# Patient Record
Sex: MALE | Race: BLACK OR AFRICAN AMERICAN | Employment: FULL TIME | URBAN - METROPOLITAN AREA
[De-identification: names, ages, dates, MRNs, and addresses within clinical notes are randomized per-mention and may not be internally consistent; named-entity substitution may affect disease eponyms.]

---

## 2017-02-20 RX ORDER — LOSARTAN POTASSIUM AND HYDROCHLOROTHIAZIDE 25; 100 MG/1; MG/1
TABLET ORAL
Qty: 30 TABLET | Refills: 0 | Status: SHIPPED | OUTPATIENT
Start: 2017-02-20 | End: 2017-03-21 | Stop reason: SDUPTHER

## 2017-06-13 ENCOUNTER — OFFICE VISIT (OUTPATIENT)
Dept: FAMILY MEDICINE CLINIC | Age: 32
End: 2017-06-13

## 2017-06-13 VITALS
TEMPERATURE: 98.6 F | HEART RATE: 76 BPM | RESPIRATION RATE: 20 BRPM | WEIGHT: 236 LBS | BODY MASS INDEX: 34.96 KG/M2 | DIASTOLIC BLOOD PRESSURE: 80 MMHG | HEIGHT: 69 IN | SYSTOLIC BLOOD PRESSURE: 124 MMHG

## 2017-06-13 DIAGNOSIS — J45.30 MILD PERSISTENT ASTHMA WITHOUT COMPLICATION: ICD-10-CM

## 2017-06-13 DIAGNOSIS — Z86.59 HISTORY OF DEPRESSION: ICD-10-CM

## 2017-06-13 DIAGNOSIS — I10 ESSENTIAL HYPERTENSION: Primary | ICD-10-CM

## 2017-06-13 DIAGNOSIS — I10 ESSENTIAL HYPERTENSION: ICD-10-CM

## 2017-06-13 DIAGNOSIS — I62.9 INTRACRANIAL HEMORRHAGE (HCC): ICD-10-CM

## 2017-06-13 PROBLEM — H18.602 KERATOCONUS OF LEFT EYE: Status: ACTIVE | Noted: 2017-06-13

## 2017-06-13 LAB
A/G RATIO: 1.6 (ref 1.1–2.2)
ALBUMIN SERPL-MCNC: 4.8 G/DL (ref 3.4–5)
ALP BLD-CCNC: 56 U/L (ref 40–129)
ALT SERPL-CCNC: 17 U/L (ref 10–40)
ANION GAP SERPL CALCULATED.3IONS-SCNC: 15 MMOL/L (ref 3–16)
AST SERPL-CCNC: 29 U/L (ref 15–37)
BILIRUB SERPL-MCNC: 0.5 MG/DL (ref 0–1)
BUN BLDV-MCNC: 24 MG/DL (ref 7–20)
CALCIUM SERPL-MCNC: 9.9 MG/DL (ref 8.3–10.6)
CHLORIDE BLD-SCNC: 99 MMOL/L (ref 99–110)
CHOLESTEROL, TOTAL: 184 MG/DL (ref 0–199)
CO2: 26 MMOL/L (ref 21–32)
CREAT SERPL-MCNC: 1.2 MG/DL (ref 0.9–1.3)
GFR AFRICAN AMERICAN: >60
GFR NON-AFRICAN AMERICAN: >60
GLOBULIN: 3 G/DL
GLUCOSE BLD-MCNC: 87 MG/DL (ref 70–99)
HDLC SERPL-MCNC: 72 MG/DL (ref 40–60)
LDL CHOLESTEROL CALCULATED: 102 MG/DL
POTASSIUM SERPL-SCNC: 3.7 MMOL/L (ref 3.5–5.1)
SODIUM BLD-SCNC: 140 MMOL/L (ref 136–145)
TOTAL PROTEIN: 7.8 G/DL (ref 6.4–8.2)
TRIGL SERPL-MCNC: 52 MG/DL (ref 0–150)
VLDLC SERPL CALC-MCNC: 10 MG/DL

## 2017-06-13 PROCEDURE — 99214 OFFICE O/P EST MOD 30 MIN: CPT | Performed by: FAMILY MEDICINE

## 2017-06-13 ASSESSMENT — PATIENT HEALTH QUESTIONNAIRE - PHQ9
2. FEELING DOWN, DEPRESSED OR HOPELESS: 0
SUM OF ALL RESPONSES TO PHQ9 QUESTIONS 1 & 2: 0
SUM OF ALL RESPONSES TO PHQ QUESTIONS 1-9: 0
1. LITTLE INTEREST OR PLEASURE IN DOING THINGS: 0

## 2017-07-24 RX ORDER — AMLODIPINE BESYLATE 10 MG/1
TABLET ORAL
Qty: 30 TABLET | Refills: 5 | Status: SHIPPED | OUTPATIENT
Start: 2017-07-24 | End: 2017-12-12 | Stop reason: SDUPTHER

## 2017-07-24 RX ORDER — LOSARTAN POTASSIUM AND HYDROCHLOROTHIAZIDE 25; 100 MG/1; MG/1
TABLET ORAL
Qty: 30 TABLET | Refills: 5 | Status: SHIPPED | OUTPATIENT
Start: 2017-07-24 | End: 2017-12-12 | Stop reason: SDUPTHER

## 2017-09-26 RX ORDER — ALBUTEROL SULFATE 2.5 MG/3ML
SOLUTION RESPIRATORY (INHALATION)
Qty: 900 VIAL | Refills: 0 | Status: SHIPPED | OUTPATIENT
Start: 2017-09-26 | End: 2019-06-24 | Stop reason: SDUPTHER

## 2017-12-12 ENCOUNTER — OFFICE VISIT (OUTPATIENT)
Dept: FAMILY MEDICINE CLINIC | Age: 32
End: 2017-12-12

## 2017-12-12 VITALS
RESPIRATION RATE: 12 BRPM | SYSTOLIC BLOOD PRESSURE: 132 MMHG | DIASTOLIC BLOOD PRESSURE: 82 MMHG | WEIGHT: 244 LBS | TEMPERATURE: 98 F | HEIGHT: 69 IN | BODY MASS INDEX: 36.14 KG/M2 | HEART RATE: 77 BPM

## 2017-12-12 DIAGNOSIS — J45.30 MILD PERSISTENT ASTHMA WITHOUT COMPLICATION: ICD-10-CM

## 2017-12-12 DIAGNOSIS — J06.9 VIRAL URI: ICD-10-CM

## 2017-12-12 DIAGNOSIS — I10 ESSENTIAL HYPERTENSION: Primary | ICD-10-CM

## 2017-12-12 PROCEDURE — 90471 IMMUNIZATION ADMIN: CPT | Performed by: FAMILY MEDICINE

## 2017-12-12 PROCEDURE — 99214 OFFICE O/P EST MOD 30 MIN: CPT | Performed by: FAMILY MEDICINE

## 2017-12-12 PROCEDURE — 90630 INFLUENZA, QUADV, 18-64 YRS, ID, PF, MICRO INJ, 0.1ML (FLUZONE QUADV, PF): CPT | Performed by: FAMILY MEDICINE

## 2017-12-12 RX ORDER — FLUTICASONE FUROATE AND VILANTEROL 100; 25 UG/1; UG/1
1 POWDER RESPIRATORY (INHALATION) DAILY
Qty: 28 EACH | Refills: 5 | Status: SHIPPED | OUTPATIENT
Start: 2017-12-12 | End: 2018-12-10

## 2017-12-12 RX ORDER — DEXTROMETHORPHAN HYDROBROMIDE AND PROMETHAZINE HYDROCHLORIDE 15; 6.25 MG/5ML; MG/5ML
5 SYRUP ORAL 4 TIMES DAILY PRN
Qty: 240 ML | Refills: 0 | Status: SHIPPED | OUTPATIENT
Start: 2017-12-12 | End: 2017-12-19

## 2017-12-12 RX ORDER — AMLODIPINE BESYLATE 10 MG/1
TABLET ORAL
Qty: 30 TABLET | Refills: 5 | Status: SHIPPED | OUTPATIENT
Start: 2017-12-12 | End: 2018-08-02 | Stop reason: SDUPTHER

## 2017-12-12 RX ORDER — LOSARTAN POTASSIUM AND HYDROCHLOROTHIAZIDE 25; 100 MG/1; MG/1
TABLET ORAL
Qty: 30 TABLET | Refills: 5 | Status: SHIPPED | OUTPATIENT
Start: 2017-12-12 | End: 2018-08-06 | Stop reason: SDUPTHER

## 2017-12-12 NOTE — PROGRESS NOTES
Vaccine Information Sheet, \"Influenza - Inactivated\"  given to Pema Cargo, or parent/legal guardian of  Pema Cargo and verbalized understanding. Patient responses:    Have you ever had a reaction to a flu vaccine? YES  Are you able to eat eggs without adverse effects? No  Do you have any current illness? No  Have you ever had [place] Syndrome? No    Flu vaccine given per order. Please see immunization tab.

## 2018-06-04 ENCOUNTER — TELEPHONE (OUTPATIENT)
Dept: FAMILY MEDICINE CLINIC | Age: 33
End: 2018-06-04

## 2018-06-04 NOTE — TELEPHONE ENCOUNTER
His aneurysm was in 2013. His 'care' after the aneurysm is monitoring his hypertension. So I am confused as to what he needs this letter to say.

## 2018-06-12 ENCOUNTER — OFFICE VISIT (OUTPATIENT)
Dept: FAMILY MEDICINE CLINIC | Age: 33
End: 2018-06-12

## 2018-06-12 VITALS
RESPIRATION RATE: 16 BRPM | WEIGHT: 248 LBS | TEMPERATURE: 98.2 F | OXYGEN SATURATION: 98 % | HEIGHT: 69 IN | DIASTOLIC BLOOD PRESSURE: 80 MMHG | HEART RATE: 52 BPM | SYSTOLIC BLOOD PRESSURE: 132 MMHG | BODY MASS INDEX: 36.73 KG/M2

## 2018-06-12 DIAGNOSIS — J45.30 MILD PERSISTENT ASTHMA WITHOUT COMPLICATION: ICD-10-CM

## 2018-06-12 DIAGNOSIS — E66.09 CLASS 2 OBESITY DUE TO EXCESS CALORIES WITHOUT SERIOUS COMORBIDITY WITH BODY MASS INDEX (BMI) OF 36.0 TO 36.9 IN ADULT: ICD-10-CM

## 2018-06-12 DIAGNOSIS — Z13.220 SCREENING, LIPID: ICD-10-CM

## 2018-06-12 DIAGNOSIS — I10 ESSENTIAL HYPERTENSION: Primary | ICD-10-CM

## 2018-06-12 DIAGNOSIS — I10 ESSENTIAL HYPERTENSION: ICD-10-CM

## 2018-06-12 DIAGNOSIS — F10.11 HISTORY OF ALCOHOL ABUSE: ICD-10-CM

## 2018-06-12 LAB
A/G RATIO: 1.5 (ref 1.1–2.2)
ALBUMIN SERPL-MCNC: 4.5 G/DL (ref 3.4–5)
ALP BLD-CCNC: 62 U/L (ref 40–129)
ALT SERPL-CCNC: 17 U/L (ref 10–40)
ANION GAP SERPL CALCULATED.3IONS-SCNC: 11 MMOL/L (ref 3–16)
AST SERPL-CCNC: 21 U/L (ref 15–37)
BILIRUB SERPL-MCNC: 0.3 MG/DL (ref 0–1)
BUN BLDV-MCNC: 24 MG/DL (ref 7–20)
CALCIUM SERPL-MCNC: 9.7 MG/DL (ref 8.3–10.6)
CHLORIDE BLD-SCNC: 100 MMOL/L (ref 99–110)
CHOLESTEROL, TOTAL: 189 MG/DL (ref 0–199)
CO2: 30 MMOL/L (ref 21–32)
CREAT SERPL-MCNC: 1 MG/DL (ref 0.9–1.3)
GFR AFRICAN AMERICAN: >60
GFR NON-AFRICAN AMERICAN: >60
GLOBULIN: 3 G/DL
GLUCOSE BLD-MCNC: 104 MG/DL (ref 70–99)
HDLC SERPL-MCNC: 59 MG/DL (ref 40–60)
LDL CHOLESTEROL CALCULATED: 103 MG/DL
POTASSIUM SERPL-SCNC: 4.2 MMOL/L (ref 3.5–5.1)
SODIUM BLD-SCNC: 141 MMOL/L (ref 136–145)
TOTAL PROTEIN: 7.5 G/DL (ref 6.4–8.2)
TRIGL SERPL-MCNC: 134 MG/DL (ref 0–150)
VLDLC SERPL CALC-MCNC: 27 MG/DL

## 2018-06-12 PROCEDURE — 99214 OFFICE O/P EST MOD 30 MIN: CPT | Performed by: FAMILY MEDICINE

## 2018-08-02 RX ORDER — AMLODIPINE BESYLATE 10 MG/1
TABLET ORAL
Qty: 90 TABLET | Refills: 1 | Status: SHIPPED | OUTPATIENT
Start: 2018-08-02 | End: 2019-01-31 | Stop reason: SDUPTHER

## 2018-08-06 RX ORDER — LOSARTAN POTASSIUM AND HYDROCHLOROTHIAZIDE 25; 100 MG/1; MG/1
TABLET ORAL
Qty: 30 TABLET | Refills: 5 | Status: SHIPPED
Start: 2018-08-06 | End: 2019-07-16

## 2018-08-17 ENCOUNTER — TELEPHONE (OUTPATIENT)
Dept: FAMILY MEDICINE CLINIC | Age: 33
End: 2018-08-17

## 2018-08-17 RX ORDER — PREDNISONE 20 MG/1
40 TABLET ORAL DAILY
Qty: 10 TABLET | Refills: 0 | Status: SHIPPED | OUTPATIENT
Start: 2018-08-17 | End: 2018-08-22

## 2018-08-17 RX ORDER — BUDESONIDE AND FORMOTEROL FUMARATE DIHYDRATE 160; 4.5 UG/1; UG/1
2 AEROSOL RESPIRATORY (INHALATION) 2 TIMES DAILY
Qty: 1 INHALER | Refills: 3 | Status: SHIPPED
Start: 2018-08-17 | End: 2019-07-16 | Stop reason: SDUPTHER

## 2018-09-27 ENCOUNTER — HOSPITAL ENCOUNTER (EMERGENCY)
Age: 33
Discharge: HOME | End: 2018-09-27
Payer: COMMERCIAL

## 2018-09-27 VITALS
TEMPERATURE: 99.3 F | OXYGEN SATURATION: 98 % | RESPIRATION RATE: 12 BRPM | WEIGHT: 255.29 LBS | SYSTOLIC BLOOD PRESSURE: 131 MMHG | BODY MASS INDEX: 37.7 KG/M2 | DIASTOLIC BLOOD PRESSURE: 91 MMHG | HEART RATE: 72 BPM

## 2018-09-27 DIAGNOSIS — J45.901 EXACERBATION OF ASTHMA, UNSPECIFIED ASTHMA SEVERITY, UNSPECIFIED WHETHER PERSISTENT: ICD-10-CM

## 2018-09-27 DIAGNOSIS — R05.9 COUGH: Primary | ICD-10-CM

## 2018-09-27 PROCEDURE — 99283 EMERGENCY DEPT VISIT LOW MDM: CPT

## 2018-09-27 PROCEDURE — 6370000000 HC RX 637 (ALT 250 FOR IP): Performed by: PHYSICIAN ASSISTANT

## 2018-09-27 PROCEDURE — 94640 AIRWAY INHALATION TREATMENT: CPT

## 2018-09-27 PROCEDURE — 94760 N-INVAS EAR/PLS OXIMETRY 1: CPT

## 2018-09-27 RX ORDER — BENZONATATE 100 MG/1
100 CAPSULE ORAL ONCE
Status: COMPLETED | OUTPATIENT
Start: 2018-09-27 | End: 2018-09-27

## 2018-09-27 RX ORDER — PREDNISONE 20 MG/1
TABLET ORAL
Qty: 27 TABLET | Refills: 0 | Status: SHIPPED | OUTPATIENT
Start: 2018-09-27 | End: 2018-12-10

## 2018-09-27 RX ORDER — PREDNISONE 20 MG/1
60 TABLET ORAL ONCE
Status: COMPLETED | OUTPATIENT
Start: 2018-09-27 | End: 2018-09-27

## 2018-09-27 RX ORDER — BENZONATATE 100 MG/1
100 CAPSULE ORAL 3 TIMES DAILY PRN
Qty: 30 CAPSULE | Refills: 0 | Status: SHIPPED | OUTPATIENT
Start: 2018-09-27 | End: 2018-10-04

## 2018-09-27 RX ORDER — IPRATROPIUM BROMIDE AND ALBUTEROL SULFATE 2.5; .5 MG/3ML; MG/3ML
1 SOLUTION RESPIRATORY (INHALATION) ONCE
Status: COMPLETED | OUTPATIENT
Start: 2018-09-27 | End: 2018-09-27

## 2018-09-27 RX ADMIN — PREDNISONE 60 MG: 20 TABLET ORAL at 18:09

## 2018-09-27 RX ADMIN — BENZONATATE 100 MG: 100 CAPSULE ORAL at 18:09

## 2018-09-27 RX ADMIN — IPRATROPIUM BROMIDE AND ALBUTEROL SULFATE 1 AMPULE: .5; 3 SOLUTION RESPIRATORY (INHALATION) at 18:02

## 2018-09-27 ASSESSMENT — ENCOUNTER SYMPTOMS
ABDOMINAL PAIN: 0
BACK PAIN: 0
NAUSEA: 0
WHEEZING: 1
SHORTNESS OF BREATH: 1
VOMITING: 0
COUGH: 1
EYE PAIN: 0
DIARRHEA: 0

## 2018-09-27 NOTE — ED PROVIDER NOTES
**EVALUATED BY ADVANCED PRACTICE PROVIDER**        [facility]  eMERGENCY dEPARTMENT eNCOUnter      Pt Name: Karla Thurston  MRN: [id number]  [place] 1985  Date of evaluation: 9/27/2018  Provider: JUAN M Sibley      Chief Complaint:    Chief Complaint   Patient presents with    Cough     cough for the past two days    Asthma     unable to control asthma since monday        Nursing Notes, Past Medical Hx, Past Surgical Hx, Social Hx, Allergies, and Family Hx were all reviewed and agreed with or any disagreements were addressed in the HPI.    HPI:  (Location, Duration, Timing, Severity, Quality, Assoc Sx, Context, Modifying factors)  This is a  29 y.o. male who presents to the ED for evaluation of asthma exacerbation. Reports nasal congestion, cough, wheezing, SOB x 2 days. Has tried home albuterol and symbicort with some mild relief. The patient denies fever or chills, chest pain, abdominal pain, nausea, vomiting, diarrhea. No recent travel, exposure to sick contacts, or recent antibiotics. No other acute concerns, associated symptoms or modifying factors.     Past Medical/Surgical History:      Diagnosis Date    Asthma     Hemorrhagic stroke ([facility] [address] 10/31/13    due to berry aneurysm, [facility]    Hypertension          Procedure Laterality Date    BRAIN ANEURYSM SURGERY  11/1/13    [facility]; Dr Bradshaw    CSF SHUNT  11/1/13       Medications:  Previous Medications    ALBUTEROL (PROVENTIL) (2.5 MG/3ML) 0.083% NEBULIZER SOLUTION    USE ONE VIAL IN NEBULIZER EVERY 6 HOURS AS NEEDED FOR WHEEZING AND FOR SHORTNESS OF BREATH    ALBUTEROL SULFATE HFA (VENTOLIN HFA) 108 (90 BASE) MCG/ACT INHALER    INHALE TWO PUFFS EVERY 6 HOURS AS NEEDED    AMLODIPINE (NORVASC) 10 MG TABLET    TAKE ONE TABLET BY MOUTH ONCE DAILY    BUDESONIDE-FORMOTEROL (SYMBICORT) 160-4.5 MCG/ACT AERO    Inhale 2 puffs into the lungs 2 times daily    CETIRIZINE (ZYRTEC) 10 MG TABLET    Take 1 tablet by mouth daily FLUTICASONE-VILANTEROL (BREO ELLIPTA) 100-25 MCG/INH AEPB INHALER    Inhale 1 puff into the lungs daily    LOSARTAN-HYDROCHLOROTHIAZIDE (HYZAAR) 100-25 MG PER TABLET    TAKE ONE TABLET BY MOUTH ONCE DAILY    NEBULIZERS (AIRIAL COMPACT MINI NEBULIZER) MISC    Use prn for asthma    RESPIRATORY THERAPY SUPPLIES (NEBULIZER/TUBING/MOUTHPIECE) KIT    1 kit by Does not apply route daily as needed         Review of Systems:  Review of Systems   Constitutional: Negative for chills, fatigue and fever. HENT: Positive for congestion. Eyes: Negative for pain. Respiratory: Positive for cough, shortness of breath and wheezing. Cardiovascular: Negative for chest pain. Gastrointestinal: Negative for abdominal pain, diarrhea, nausea and vomiting. Genitourinary: Negative for dysuria. Musculoskeletal: Negative for back pain, neck pain and neck stiffness. Skin: Negative for rash. Neurological: Negative for dizziness and headaches. Psychiatric/Behavioral: Negative for confusion. Positives and Pertinent negatives as per HPI. Except as noted above in the ROS, problem specific ROS was completed and is negative. Physical Exam:  Physical Exam   Constitutional: He is oriented to person, place, and time. He appears well-developed. No distress. HENT:   Head: Normocephalic and atraumatic. Eyes: Conjunctivae are normal. Right eye exhibits no discharge. Left eye exhibits no discharge. Neck: Normal range of motion. Neck supple. Cardiovascular: Normal rate. Pulmonary/Chest: Effort normal and breath sounds normal. No stridor. No respiratory distress. Musculoskeletal: Normal range of motion. Neurological: He is alert and oriented to person, place, and time. No gross facial drooping. Moves all 4 extremities spontaneously. Skin: Skin is warm and dry. He is not diaphoretic. No pallor. Psychiatric: He has a normal mood and affect. His behavior is normal.   Nursing note and vitals reviewed.       MEDICAL

## 2018-12-10 ENCOUNTER — OFFICE VISIT (OUTPATIENT)
Dept: FAMILY MEDICINE CLINIC | Age: 33
End: 2018-12-10
Payer: COMMERCIAL

## 2018-12-10 VITALS
HEART RATE: 75 BPM | HEIGHT: 69 IN | DIASTOLIC BLOOD PRESSURE: 84 MMHG | RESPIRATION RATE: 12 BRPM | TEMPERATURE: 98.4 F | BODY MASS INDEX: 38.21 KG/M2 | SYSTOLIC BLOOD PRESSURE: 128 MMHG | WEIGHT: 258 LBS

## 2018-12-10 DIAGNOSIS — I10 ESSENTIAL HYPERTENSION: Primary | ICD-10-CM

## 2018-12-10 DIAGNOSIS — J45.30 MILD PERSISTENT ASTHMA WITHOUT COMPLICATION: ICD-10-CM

## 2018-12-10 DIAGNOSIS — E66.09 CLASS 2 OBESITY DUE TO EXCESS CALORIES WITHOUT SERIOUS COMORBIDITY WITH BODY MASS INDEX (BMI) OF 38.0 TO 38.9 IN ADULT: ICD-10-CM

## 2018-12-10 PROCEDURE — 90471 IMMUNIZATION ADMIN: CPT | Performed by: FAMILY MEDICINE

## 2018-12-10 PROCEDURE — 99214 OFFICE O/P EST MOD 30 MIN: CPT | Performed by: FAMILY MEDICINE

## 2018-12-10 PROCEDURE — 90682 RIV4 VACC RECOMBINANT DNA IM: CPT | Performed by: FAMILY MEDICINE

## 2018-12-10 ASSESSMENT — PATIENT HEALTH QUESTIONNAIRE - PHQ9
SUM OF ALL RESPONSES TO PHQ9 QUESTIONS 1 & 2: 0
1. LITTLE INTEREST OR PLEASURE IN DOING THINGS: 0
2. FEELING DOWN, DEPRESSED OR HOPELESS: 0
SUM OF ALL RESPONSES TO PHQ QUESTIONS 1-9: 0
SUM OF ALL RESPONSES TO PHQ QUESTIONS 1-9: 0

## 2019-02-04 ENCOUNTER — TELEPHONE (OUTPATIENT)
Dept: FAMILY MEDICINE CLINIC | Age: 34
End: 2019-02-04

## 2019-02-04 RX ORDER — LOSARTAN POTASSIUM 100 MG/1
100 TABLET ORAL DAILY
Qty: 90 TABLET | Refills: 1 | Status: SHIPPED
Start: 2019-02-04 | End: 2019-07-16 | Stop reason: SDUPTHER

## 2019-02-04 RX ORDER — HYDROCHLOROTHIAZIDE 25 MG/1
25 TABLET ORAL EVERY MORNING
Qty: 90 TABLET | Refills: 1 | Status: SHIPPED
Start: 2019-02-04 | End: 2019-07-16 | Stop reason: SDUPTHER

## 2019-06-24 RX ORDER — ALBUTEROL SULFATE 2.5 MG/3ML
SOLUTION RESPIRATORY (INHALATION)
Qty: 900 VIAL | Refills: 0 | Status: SHIPPED | OUTPATIENT
Start: 2019-06-24 | End: 2019-06-25 | Stop reason: SDUPTHER

## 2019-06-24 NOTE — TELEPHONE ENCOUNTER
Pt will like to have a refill on this medication please. albuterol (PROVENTIL) (2.5 MG/3ML) 0.083% nebulizer solution         Thanks.

## 2019-06-25 RX ORDER — ALBUTEROL SULFATE 2.5 MG/3ML
SOLUTION RESPIRATORY (INHALATION)
Qty: 300 VIAL | Refills: 0 | Status: SHIPPED | OUTPATIENT
Start: 2019-06-25 | End: 2020-11-25

## 2019-07-16 ENCOUNTER — OFFICE VISIT (OUTPATIENT)
Dept: FAMILY MEDICINE CLINIC | Age: 34
End: 2019-07-16
Payer: COMMERCIAL

## 2019-07-16 VITALS
SYSTOLIC BLOOD PRESSURE: 142 MMHG | DIASTOLIC BLOOD PRESSURE: 100 MMHG | BODY MASS INDEX: 37.18 KG/M2 | WEIGHT: 251 LBS | HEIGHT: 69 IN | OXYGEN SATURATION: 98 % | HEART RATE: 74 BPM | TEMPERATURE: 98.4 F

## 2019-07-16 DIAGNOSIS — J45.31 MILD PERSISTENT ASTHMA WITH EXACERBATION: ICD-10-CM

## 2019-07-16 DIAGNOSIS — I10 ESSENTIAL HYPERTENSION: ICD-10-CM

## 2019-07-16 DIAGNOSIS — I10 ESSENTIAL HYPERTENSION: Primary | ICD-10-CM

## 2019-07-16 LAB
ANION GAP SERPL CALCULATED.3IONS-SCNC: 13 MMOL/L (ref 3–16)
BUN BLDV-MCNC: 15 MG/DL (ref 7–20)
CALCIUM SERPL-MCNC: 9.3 MG/DL (ref 8.3–10.6)
CHLORIDE BLD-SCNC: 101 MMOL/L (ref 99–110)
CO2: 28 MMOL/L (ref 21–32)
CREAT SERPL-MCNC: 1.1 MG/DL (ref 0.9–1.3)
GFR AFRICAN AMERICAN: >60
GFR NON-AFRICAN AMERICAN: >60
GLUCOSE BLD-MCNC: 97 MG/DL (ref 70–99)
POTASSIUM SERPL-SCNC: 3.9 MMOL/L (ref 3.5–5.1)
SODIUM BLD-SCNC: 142 MMOL/L (ref 136–145)

## 2019-07-16 PROCEDURE — 99214 OFFICE O/P EST MOD 30 MIN: CPT | Performed by: FAMILY MEDICINE

## 2019-07-16 RX ORDER — PREDNISONE 20 MG/1
40 TABLET ORAL DAILY
Qty: 10 TABLET | Refills: 0 | Status: SHIPPED | OUTPATIENT
Start: 2019-07-16 | End: 2019-07-21

## 2019-07-16 RX ORDER — HYDROCHLOROTHIAZIDE 25 MG/1
25 TABLET ORAL EVERY MORNING
Qty: 90 TABLET | Refills: 1 | Status: SHIPPED | OUTPATIENT
Start: 2019-07-16 | End: 2020-06-11

## 2019-07-16 RX ORDER — LOSARTAN POTASSIUM 100 MG/1
100 TABLET ORAL DAILY
Qty: 90 TABLET | Refills: 1 | Status: SHIPPED | OUTPATIENT
Start: 2019-07-16 | End: 2019-10-05

## 2019-07-16 RX ORDER — AMLODIPINE BESYLATE 10 MG/1
TABLET ORAL
Qty: 90 TABLET | Refills: 1 | Status: SHIPPED | OUTPATIENT
Start: 2019-07-16 | End: 2020-04-03

## 2019-07-16 RX ORDER — BUDESONIDE AND FORMOTEROL FUMARATE DIHYDRATE 160; 4.5 UG/1; UG/1
2 AEROSOL RESPIRATORY (INHALATION) 2 TIMES DAILY
Qty: 1 INHALER | Refills: 3 | Status: SHIPPED | OUTPATIENT
Start: 2019-07-16 | End: 2020-05-22

## 2019-07-16 RX ORDER — ALBUTEROL SULFATE 90 UG/1
AEROSOL, METERED RESPIRATORY (INHALATION)
Qty: 18 G | Refills: 1 | Status: SHIPPED | OUTPATIENT
Start: 2019-07-16 | End: 2020-11-06 | Stop reason: SDUPTHER

## 2019-07-16 ASSESSMENT — PATIENT HEALTH QUESTIONNAIRE - PHQ9
1. LITTLE INTEREST OR PLEASURE IN DOING THINGS: 0
2. FEELING DOWN, DEPRESSED OR HOPELESS: 0
SUM OF ALL RESPONSES TO PHQ9 QUESTIONS 1 & 2: 0
SUM OF ALL RESPONSES TO PHQ QUESTIONS 1-9: 0
SUM OF ALL RESPONSES TO PHQ QUESTIONS 1-9: 0

## 2019-07-23 ENCOUNTER — NURSE ONLY (OUTPATIENT)
Dept: FAMILY MEDICINE CLINIC | Age: 34
End: 2019-07-23

## 2019-07-23 VITALS — SYSTOLIC BLOOD PRESSURE: 130 MMHG | DIASTOLIC BLOOD PRESSURE: 90 MMHG | HEART RATE: 68 BPM

## 2019-07-23 DIAGNOSIS — I10 ESSENTIAL HYPERTENSION: Primary | ICD-10-CM

## 2019-10-05 ENCOUNTER — ANESTHESIA (OUTPATIENT)
Dept: OPERATING ROOM | Age: 34
End: 2019-10-05
Payer: COMMERCIAL

## 2019-10-05 ENCOUNTER — ANESTHESIA EVENT (OUTPATIENT)
Dept: OPERATING ROOM | Age: 34
End: 2019-10-05
Payer: COMMERCIAL

## 2019-10-05 ENCOUNTER — APPOINTMENT (OUTPATIENT)
Dept: ULTRASOUND IMAGING | Age: 34
End: 2019-10-05
Payer: COMMERCIAL

## 2019-10-05 ENCOUNTER — HOSPITAL ENCOUNTER (EMERGENCY)
Age: 34
Discharge: HOME | End: 2019-10-05
Attending: EMERGENCY MEDICINE
Payer: COMMERCIAL

## 2019-10-05 VITALS
SYSTOLIC BLOOD PRESSURE: 131 MMHG | DIASTOLIC BLOOD PRESSURE: 88 MMHG | RESPIRATION RATE: 25 BRPM | OXYGEN SATURATION: 100 % | TEMPERATURE: 97.2 F

## 2019-10-05 VITALS
TEMPERATURE: 97 F | HEART RATE: 66 BPM | SYSTOLIC BLOOD PRESSURE: 146 MMHG | OXYGEN SATURATION: 100 % | WEIGHT: 252 LBS | DIASTOLIC BLOOD PRESSURE: 82 MMHG | HEIGHT: 69 IN | RESPIRATION RATE: 20 BRPM | BODY MASS INDEX: 37.33 KG/M2

## 2019-10-05 DIAGNOSIS — N44.00 TORSION OF RIGHT TESTICLE: ICD-10-CM

## 2019-10-05 DIAGNOSIS — N44.00 TESTICULAR TORSION: Primary | ICD-10-CM

## 2019-10-05 LAB
ABO/RH: NORMAL
ANION GAP SERPL CALCULATED.3IONS-SCNC: 16 MMOL/L (ref 3–16)
ANTIBODY SCREEN: NORMAL
BASOPHILS ABSOLUTE: 0 K/UL (ref 0–0.2)
BASOPHILS RELATIVE PERCENT: 0.5 %
BUN BLDV-MCNC: 22 MG/DL (ref 7–20)
CALCIUM SERPL-MCNC: 9.2 MG/DL (ref 8.3–10.6)
CHLORIDE BLD-SCNC: 99 MMOL/L (ref 99–110)
CO2: 24 MMOL/L (ref 21–32)
CREAT SERPL-MCNC: 1.1 MG/DL (ref 0.9–1.3)
EOSINOPHILS ABSOLUTE: 0.2 K/UL (ref 0–0.6)
EOSINOPHILS RELATIVE PERCENT: 2.4 %
GFR AFRICAN AMERICAN: >60
GFR NON-AFRICAN AMERICAN: >60
GLUCOSE BLD-MCNC: 131 MG/DL (ref 70–99)
HCT VFR BLD CALC: 40.6 % (ref 40.5–52.5)
HEMOGLOBIN: 13.7 G/DL (ref 13.5–17.5)
LYMPHOCYTES ABSOLUTE: 1.1 K/UL (ref 1–5.1)
LYMPHOCYTES RELATIVE PERCENT: 13.6 %
MAGNESIUM: 2.1 MG/DL (ref 1.8–2.4)
MCH RBC QN AUTO: 28.9 PG (ref 26–34)
MCHC RBC AUTO-ENTMCNC: 33.9 G/DL (ref 31–36)
MCV RBC AUTO: 85.3 FL (ref 80–100)
MONOCYTES ABSOLUTE: 0.7 K/UL (ref 0–1.3)
MONOCYTES RELATIVE PERCENT: 8 %
NEUTROPHILS ABSOLUTE: 6.2 K/UL (ref 1.7–7.7)
NEUTROPHILS RELATIVE PERCENT: 75.5 %
PDW BLD-RTO: 13.5 % (ref 12.4–15.4)
PLATELET # BLD: 286 K/UL (ref 135–450)
PMV BLD AUTO: 7 FL (ref 5–10.5)
POTASSIUM REFLEX MAGNESIUM: 3.1 MMOL/L (ref 3.5–5.1)
RBC # BLD: 4.76 M/UL (ref 4.2–5.9)
SODIUM BLD-SCNC: 139 MMOL/L (ref 136–145)
WBC # BLD: 8.2 K/UL (ref 4–11)

## 2019-10-05 PROCEDURE — 94761 N-INVAS EAR/PLS OXIMETRY MLT: CPT

## 2019-10-05 PROCEDURE — 99285 EMERGENCY DEPT VISIT HI MDM: CPT

## 2019-10-05 PROCEDURE — 2500000003 HC RX 250 WO HCPCS: Performed by: ANESTHESIOLOGY

## 2019-10-05 PROCEDURE — 76870 US EXAM SCROTUM: CPT

## 2019-10-05 PROCEDURE — 94640 AIRWAY INHALATION TREATMENT: CPT

## 2019-10-05 PROCEDURE — 3700000000 HC ANESTHESIA ATTENDED CARE: Performed by: UROLOGY

## 2019-10-05 PROCEDURE — 2709999900 HC NON-CHARGEABLE SUPPLY: Performed by: UROLOGY

## 2019-10-05 PROCEDURE — 7100000011 HC PHASE II RECOVERY - ADDTL 15 MIN: Performed by: UROLOGY

## 2019-10-05 PROCEDURE — 83735 ASSAY OF MAGNESIUM: CPT

## 2019-10-05 PROCEDURE — 3700000001 HC ADD 15 MINUTES (ANESTHESIA): Performed by: UROLOGY

## 2019-10-05 PROCEDURE — 7100000001 HC PACU RECOVERY - ADDTL 15 MIN: Performed by: UROLOGY

## 2019-10-05 PROCEDURE — 6360000002 HC RX W HCPCS: Performed by: UROLOGY

## 2019-10-05 PROCEDURE — 6370000000 HC RX 637 (ALT 250 FOR IP): Performed by: EMERGENCY MEDICINE

## 2019-10-05 PROCEDURE — 3600000002 HC SURGERY LEVEL 2 BASE: Performed by: UROLOGY

## 2019-10-05 PROCEDURE — 86901 BLOOD TYPING SEROLOGIC RH(D): CPT

## 2019-10-05 PROCEDURE — 96374 THER/PROPH/DIAG INJ IV PUSH: CPT

## 2019-10-05 PROCEDURE — 86850 RBC ANTIBODY SCREEN: CPT

## 2019-10-05 PROCEDURE — 6360000002 HC RX W HCPCS: Performed by: ANESTHESIOLOGY

## 2019-10-05 PROCEDURE — 6370000000 HC RX 637 (ALT 250 FOR IP): Performed by: ANESTHESIOLOGY

## 2019-10-05 PROCEDURE — 2580000003 HC RX 258: Performed by: ANESTHESIOLOGY

## 2019-10-05 PROCEDURE — 6360000002 HC RX W HCPCS: Performed by: EMERGENCY MEDICINE

## 2019-10-05 PROCEDURE — 2500000003 HC RX 250 WO HCPCS: Performed by: UROLOGY

## 2019-10-05 PROCEDURE — 3600000012 HC SURGERY LEVEL 2 ADDTL 15MIN: Performed by: UROLOGY

## 2019-10-05 PROCEDURE — 96361 HYDRATE IV INFUSION ADD-ON: CPT

## 2019-10-05 PROCEDURE — 86900 BLOOD TYPING SEROLOGIC ABO: CPT

## 2019-10-05 PROCEDURE — 2580000003 HC RX 258: Performed by: EMERGENCY MEDICINE

## 2019-10-05 PROCEDURE — 80048 BASIC METABOLIC PNL TOTAL CA: CPT

## 2019-10-05 PROCEDURE — 85025 COMPLETE CBC W/AUTO DIFF WBC: CPT

## 2019-10-05 PROCEDURE — 7100000010 HC PHASE II RECOVERY - FIRST 15 MIN: Performed by: UROLOGY

## 2019-10-05 PROCEDURE — 96375 TX/PRO/DX INJ NEW DRUG ADDON: CPT

## 2019-10-05 PROCEDURE — 7100000000 HC PACU RECOVERY - FIRST 15 MIN: Performed by: UROLOGY

## 2019-10-05 RX ORDER — FENTANYL CITRATE 50 UG/ML
25 INJECTION, SOLUTION INTRAMUSCULAR; INTRAVENOUS EVERY 5 MIN PRN
Status: DISCONTINUED | OUTPATIENT
Start: 2019-10-05 | End: 2019-10-05 | Stop reason: HOSPADM

## 2019-10-05 RX ORDER — PROPOFOL 10 MG/ML
INJECTION, EMULSION INTRAVENOUS PRN
Status: DISCONTINUED | OUTPATIENT
Start: 2019-10-05 | End: 2019-10-05 | Stop reason: SDUPTHER

## 2019-10-05 RX ORDER — ONDANSETRON 2 MG/ML
4 INJECTION INTRAMUSCULAR; INTRAVENOUS
Status: DISCONTINUED | OUTPATIENT
Start: 2019-10-05 | End: 2019-10-05 | Stop reason: HOSPADM

## 2019-10-05 RX ORDER — HYDROCODONE BITARTRATE AND ACETAMINOPHEN 5; 325 MG/1; MG/1
1 TABLET ORAL EVERY 6 HOURS PRN
Qty: 10 TABLET | Refills: 0 | Status: SHIPPED | OUTPATIENT
Start: 2019-10-05 | End: 2019-10-08

## 2019-10-05 RX ORDER — ONDANSETRON 2 MG/ML
INJECTION INTRAMUSCULAR; INTRAVENOUS PRN
Status: DISCONTINUED | OUTPATIENT
Start: 2019-10-05 | End: 2019-10-05 | Stop reason: SDUPTHER

## 2019-10-05 RX ORDER — OXYCODONE HYDROCHLORIDE AND ACETAMINOPHEN 5; 325 MG/1; MG/1
1 TABLET ORAL ONCE
Status: COMPLETED | OUTPATIENT
Start: 2019-10-05 | End: 2019-10-05

## 2019-10-05 RX ORDER — SUCCINYLCHOLINE/SOD CL,ISO/PF 200MG/10ML
SYRINGE (ML) INTRAVENOUS PRN
Status: DISCONTINUED | OUTPATIENT
Start: 2019-10-05 | End: 2019-10-05 | Stop reason: SDUPTHER

## 2019-10-05 RX ORDER — OXYCODONE HYDROCHLORIDE AND ACETAMINOPHEN 5; 325 MG/1; MG/1
2 TABLET ORAL PRN
Status: COMPLETED | OUTPATIENT
Start: 2019-10-05 | End: 2019-10-05

## 2019-10-05 RX ORDER — IPRATROPIUM BROMIDE AND ALBUTEROL SULFATE 2.5; .5 MG/3ML; MG/3ML
1 SOLUTION RESPIRATORY (INHALATION) ONCE
Status: COMPLETED | OUTPATIENT
Start: 2019-10-05 | End: 2019-10-05

## 2019-10-05 RX ORDER — 0.9 % SODIUM CHLORIDE 0.9 %
500 INTRAVENOUS SOLUTION INTRAVENOUS ONCE
Status: COMPLETED | OUTPATIENT
Start: 2019-10-05 | End: 2019-10-05

## 2019-10-05 RX ORDER — OXYCODONE HYDROCHLORIDE AND ACETAMINOPHEN 5; 325 MG/1; MG/1
1 TABLET ORAL PRN
Status: COMPLETED | OUTPATIENT
Start: 2019-10-05 | End: 2019-10-05

## 2019-10-05 RX ORDER — BUPIVACAINE HYDROCHLORIDE 2.5 MG/ML
INJECTION, SOLUTION EPIDURAL; INFILTRATION; INTRACAUDAL
Status: COMPLETED | OUTPATIENT
Start: 2019-10-05 | End: 2019-10-05

## 2019-10-05 RX ORDER — KETOROLAC TROMETHAMINE 30 MG/ML
INJECTION, SOLUTION INTRAMUSCULAR; INTRAVENOUS PRN
Status: DISCONTINUED | OUTPATIENT
Start: 2019-10-05 | End: 2019-10-05 | Stop reason: SDUPTHER

## 2019-10-05 RX ORDER — DEXAMETHASONE SODIUM PHOSPHATE 4 MG/ML
INJECTION, SOLUTION INTRA-ARTICULAR; INTRALESIONAL; INTRAMUSCULAR; INTRAVENOUS; SOFT TISSUE PRN
Status: DISCONTINUED | OUTPATIENT
Start: 2019-10-05 | End: 2019-10-05 | Stop reason: SDUPTHER

## 2019-10-05 RX ORDER — LIDOCAINE HYDROCHLORIDE 20 MG/ML
INJECTION, SOLUTION EPIDURAL; INFILTRATION; INTRACAUDAL; PERINEURAL PRN
Status: DISCONTINUED | OUTPATIENT
Start: 2019-10-05 | End: 2019-10-05 | Stop reason: SDUPTHER

## 2019-10-05 RX ORDER — LOSARTAN POTASSIUM 100 MG/1
100 TABLET ORAL DAILY
COMMUNITY
End: 2020-04-03

## 2019-10-05 RX ORDER — MORPHINE SULFATE 4 MG/ML
4 INJECTION, SOLUTION INTRAMUSCULAR; INTRAVENOUS ONCE
Status: COMPLETED | OUTPATIENT
Start: 2019-10-05 | End: 2019-10-05

## 2019-10-05 RX ORDER — SODIUM CHLORIDE, SODIUM LACTATE, POTASSIUM CHLORIDE, CALCIUM CHLORIDE 600; 310; 30; 20 MG/100ML; MG/100ML; MG/100ML; MG/100ML
INJECTION, SOLUTION INTRAVENOUS CONTINUOUS PRN
Status: DISCONTINUED | OUTPATIENT
Start: 2019-10-05 | End: 2019-10-05 | Stop reason: SDUPTHER

## 2019-10-05 RX ADMIN — DEXAMETHASONE SODIUM PHOSPHATE 8 MG: 4 INJECTION, SOLUTION INTRAMUSCULAR; INTRAVENOUS at 15:41

## 2019-10-05 RX ADMIN — ONDANSETRON 4 MG: 2 INJECTION INTRAMUSCULAR; INTRAVENOUS at 15:41

## 2019-10-05 RX ADMIN — Medication 2 G: at 14:56

## 2019-10-05 RX ADMIN — Medication 140 MG: at 15:28

## 2019-10-05 RX ADMIN — PROPOFOL 200 MG: 10 INJECTION, EMULSION INTRAVENOUS at 15:28

## 2019-10-05 RX ADMIN — MORPHINE SULFATE 4 MG: 4 INJECTION, SOLUTION INTRAMUSCULAR; INTRAVENOUS at 14:24

## 2019-10-05 RX ADMIN — IPRATROPIUM BROMIDE AND ALBUTEROL SULFATE 1 AMPULE: .5; 3 SOLUTION RESPIRATORY (INHALATION) at 16:45

## 2019-10-05 RX ADMIN — OXYCODONE HYDROCHLORIDE AND ACETAMINOPHEN 2 TABLET: 5; 325 TABLET ORAL at 17:13

## 2019-10-05 RX ADMIN — LIDOCAINE HYDROCHLORIDE 100 MG: 20 INJECTION, SOLUTION EPIDURAL; INFILTRATION; INTRACAUDAL; PERINEURAL at 15:28

## 2019-10-05 RX ADMIN — OXYCODONE HYDROCHLORIDE AND ACETAMINOPHEN 1 TABLET: 5; 325 TABLET ORAL at 12:49

## 2019-10-05 RX ADMIN — SODIUM CHLORIDE 500 ML: 9 INJECTION, SOLUTION INTRAVENOUS at 14:10

## 2019-10-05 RX ADMIN — SODIUM CHLORIDE, SODIUM LACTATE, POTASSIUM CHLORIDE, AND CALCIUM CHLORIDE: .6; .31; .03; .02 INJECTION, SOLUTION INTRAVENOUS at 15:39

## 2019-10-05 RX ADMIN — FENTANYL CITRATE 50 MCG: 50 INJECTION INTRAMUSCULAR; INTRAVENOUS at 15:28

## 2019-10-05 RX ADMIN — SODIUM CHLORIDE 500 ML: 9 INJECTION, SOLUTION INTRAVENOUS at 16:05

## 2019-10-05 RX ADMIN — KETOROLAC TROMETHAMINE 30 MG: 30 INJECTION, SOLUTION INTRAMUSCULAR at 15:45

## 2019-10-05 RX ADMIN — FENTANYL CITRATE 25 MCG: 50 INJECTION INTRAMUSCULAR; INTRAVENOUS at 16:56

## 2019-10-05 RX ADMIN — FENTANYL CITRATE 50 MCG: 50 INJECTION INTRAMUSCULAR; INTRAVENOUS at 15:23

## 2019-10-05 ASSESSMENT — PULMONARY FUNCTION TESTS
PIF_VALUE: 17
PIF_VALUE: 18
PIF_VALUE: 8
PIF_VALUE: 17
PIF_VALUE: 17
PIF_VALUE: 2
PIF_VALUE: 17
PIF_VALUE: 13
PIF_VALUE: 1
PIF_VALUE: 17
PIF_VALUE: 21
PIF_VALUE: 17
PIF_VALUE: 14
PIF_VALUE: 17
PIF_VALUE: 17
PIF_VALUE: 3
PIF_VALUE: 17
PIF_VALUE: 18
PIF_VALUE: 17
PIF_VALUE: 0
PIF_VALUE: 18
PIF_VALUE: 14
PIF_VALUE: 17
PIF_VALUE: 18
PIF_VALUE: 17
PIF_VALUE: 17
PIF_VALUE: 0
PIF_VALUE: 17
PIF_VALUE: 15
PIF_VALUE: 17
PIF_VALUE: 17
PIF_VALUE: 18
PIF_VALUE: 17
PIF_VALUE: 17
PIF_VALUE: 2
PIF_VALUE: 17

## 2019-10-05 ASSESSMENT — PAIN DESCRIPTION - FREQUENCY
FREQUENCY: CONTINUOUS

## 2019-10-05 ASSESSMENT — PAIN DESCRIPTION - PAIN TYPE
TYPE: SURGICAL PAIN
TYPE: SURGICAL PAIN
TYPE: ACUTE PAIN
TYPE: SURGICAL PAIN
TYPE: ACUTE PAIN
TYPE: SURGICAL PAIN
TYPE: ACUTE PAIN
TYPE: SURGICAL PAIN

## 2019-10-05 ASSESSMENT — PAIN DESCRIPTION - LOCATION
LOCATION: SCROTUM
LOCATION: SCROTUM
LOCATION: BACK
LOCATION: SCROTUM

## 2019-10-05 ASSESSMENT — PAIN SCALES - GENERAL
PAINLEVEL_OUTOF10: 4
PAINLEVEL_OUTOF10: 6
PAINLEVEL_OUTOF10: 4
PAINLEVEL_OUTOF10: 3
PAINLEVEL_OUTOF10: 0
PAINLEVEL_OUTOF10: 4
PAINLEVEL_OUTOF10: 10
PAINLEVEL_OUTOF10: 10
PAINLEVEL_OUTOF10: 7
PAINLEVEL_OUTOF10: 10
PAINLEVEL_OUTOF10: 4
PAINLEVEL_OUTOF10: 5
PAINLEVEL_OUTOF10: 0

## 2019-10-05 ASSESSMENT — ENCOUNTER SYMPTOMS
SHORTNESS OF BREATH: 0
ABDOMINAL PAIN: 0
VOMITING: 0
SHORTNESS OF BREATH: 0

## 2019-10-05 ASSESSMENT — PAIN DESCRIPTION - ORIENTATION
ORIENTATION: MID
ORIENTATION: RIGHT
ORIENTATION: LOWER

## 2019-10-05 ASSESSMENT — PAIN DESCRIPTION - PROGRESSION
CLINICAL_PROGRESSION: GRADUALLY WORSENING
CLINICAL_PROGRESSION: GRADUALLY IMPROVING
CLINICAL_PROGRESSION: NOT CHANGED
CLINICAL_PROGRESSION: NOT CHANGED
CLINICAL_PROGRESSION: GRADUALLY IMPROVING

## 2019-10-05 ASSESSMENT — PAIN DESCRIPTION - DESCRIPTORS
DESCRIPTORS: ACHING
DESCRIPTORS: OTHER (COMMENT)
DESCRIPTORS: ACHING
DESCRIPTORS: OTHER (COMMENT)

## 2019-10-05 ASSESSMENT — PAIN DESCRIPTION - ONSET
ONSET: SUDDEN
ONSET: ON-GOING
ONSET: SUDDEN
ONSET: ON-GOING

## 2019-10-05 ASSESSMENT — PAIN - FUNCTIONAL ASSESSMENT: PAIN_FUNCTIONAL_ASSESSMENT: PREVENTS OR INTERFERES WITH MANY ACTIVE NOT PASSIVE ACTIVITIES

## 2020-04-03 RX ORDER — LOSARTAN POTASSIUM 100 MG/1
TABLET ORAL
Qty: 90 TABLET | Refills: 0 | Status: SHIPPED | OUTPATIENT
Start: 2020-04-03 | End: 2020-07-09

## 2020-04-03 RX ORDER — AMLODIPINE BESYLATE 10 MG/1
TABLET ORAL
Qty: 90 TABLET | Refills: 0 | Status: SHIPPED | OUTPATIENT
Start: 2020-04-03 | End: 2020-08-06

## 2020-05-22 RX ORDER — BUDESONIDE AND FORMOTEROL FUMARATE DIHYDRATE 160; 4.5 UG/1; UG/1
AEROSOL RESPIRATORY (INHALATION)
Qty: 11 G | Refills: 0 | Status: SHIPPED | OUTPATIENT
Start: 2020-05-22 | End: 2020-08-06

## 2020-06-11 RX ORDER — HYDROCHLOROTHIAZIDE 25 MG/1
TABLET ORAL
Qty: 30 TABLET | Refills: 0 | Status: SHIPPED | OUTPATIENT
Start: 2020-06-11 | End: 2020-08-06

## 2020-08-21 ENCOUNTER — OFFICE VISIT (OUTPATIENT)
Dept: FAMILY MEDICINE CLINIC | Age: 35
End: 2020-08-21
Payer: COMMERCIAL

## 2020-08-21 VITALS
HEART RATE: 69 BPM | DIASTOLIC BLOOD PRESSURE: 86 MMHG | SYSTOLIC BLOOD PRESSURE: 136 MMHG | WEIGHT: 258.4 LBS | BODY MASS INDEX: 38.27 KG/M2 | RESPIRATION RATE: 14 BRPM | TEMPERATURE: 97.3 F | OXYGEN SATURATION: 97 % | HEIGHT: 69 IN

## 2020-08-21 PROCEDURE — 99214 OFFICE O/P EST MOD 30 MIN: CPT | Performed by: FAMILY MEDICINE

## 2020-08-21 ASSESSMENT — PATIENT HEALTH QUESTIONNAIRE - PHQ9
SUM OF ALL RESPONSES TO PHQ QUESTIONS 1-9: 0
SUM OF ALL RESPONSES TO PHQ QUESTIONS 1-9: 0
1. LITTLE INTEREST OR PLEASURE IN DOING THINGS: 0
2. FEELING DOWN, DEPRESSED OR HOPELESS: 0
SUM OF ALL RESPONSES TO PHQ9 QUESTIONS 1 & 2: 0

## 2020-08-21 ASSESSMENT — ENCOUNTER SYMPTOMS
ALLERGIC/IMMUNOLOGIC NEGATIVE: 1
EYES NEGATIVE: 1
RESPIRATORY NEGATIVE: 1
GASTROINTESTINAL NEGATIVE: 1

## 2020-08-21 NOTE — PROGRESS NOTES
Problem List   Diagnosis    Allergic rhinitis- dust, pollen animals.  Asthma    HTN (hypertension)    History of alcohol abuse- quit 2010    History of depression- treated inpatient at [facility] 2008    Intracranial hemorrhage 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 Wt Readings from Last 3 Encounters:   08/21/20 258 lb 6.4 oz (117.2 kg)   10/05/19 252 lb (114.3 kg)   07/16/19 251 lb (113.9 kg)      BP Readings from Last 3 Encounters:   08/21/20 136/86   10/05/19 (!) 146/82   10/05/19 131/88      Current Outpatient Medications   Medication Sig Dispense Refill    hydroCHLOROthiazide (HYDRODIURIL) 25 MG tablet TAKE 1 TABLET BY MOUTH ONCE DAILY IN THE MORNING 30 tablet 0    SYMBICORT 160-4.5 MCG/ACT AERO Inhale 2 puffs by mouth twice daily 11 g 0    amLODIPine (NORVASC) 10 MG tablet Take 1 tablet by mouth once daily 30 tablet 0    losartan (COZAAR) 100 MG tablet Take 1 tablet by mouth once daily 30 tablet 0    albuterol sulfate HFA (VENTOLIN HFA) 108 (90 Base) MCG/ACT inhaler INHALE TWO PUFFS EVERY 6 HOURS AS NEEDED 18 g 1    albuterol (PROVENTIL) (2.5 MG/3ML) 0.083% nebulizer solution USE ONE VIAL IN NEBULIZER EVERY 6 HOURS AS NEEDED FOR WHEEZING AND FOR SHORTNESS OF BREATH 300 vial 0    Nebulizers (AIRIAL COMPACT MINI NEBULIZER) MISC Use prn for asthma 1 each 0    Respiratory Therapy Supplies (NEBULIZER/TUBING/MOUTHPIECE) KIT 1 kit by Does not apply route daily as needed 1 kit 0     No current facility-administered medications for this visit.        Immunization History   Administered Date(s) Administered    Influenza, Intradermal, Quadrivalent, Preservative Free 12/12/2017    Influenza, MDCK, Preservative free 12/17/2016    Influenza, Quadv, Recombinant, IM PF (Flublok 18 yrs and older) 12/10/2018    Pneumococcal Polysaccharide (Ekcptiifn43) 12/17/2016    Tdap (Boostrix, Adacel) 07/15/2014, 09/21/2016 Social History     Tobacco Use    Smoking status: Never Smoker    Smokeless tobacco: Never Used   Substance Use Topics    Alcohol use: Yes     Comment: hx alcoholism. drinks occasionally now, but only 1/day.  Drug use: No        Review of Systems   Constitutional: Negative. HENT: Negative. Eyes: Negative. Respiratory: Negative. Cardiovascular: Negative. Gastrointestinal: Negative. Endocrine: Negative. Genitourinary: Negative. Musculoskeletal: Negative. Foot pain R   Skin: Negative. Allergic/Immunologic: Negative. Neurological: Negative. Hematological: Negative. Psychiatric/Behavioral: Negative. As above     Objective:   Physical Exam  Vitals signs and nursing note reviewed. Constitutional:       General: He is not in acute distress. Appearance: Normal appearance. He is well-developed. He is not diaphoretic. Neck:      Musculoskeletal: Normal range of motion and neck supple. Cardiovascular:      Rate and Rhythm: Normal rate and regular rhythm. Pulses: Normal pulses. Heart sounds: Normal heart sounds. No murmur. Pulmonary:      Effort: Pulmonary effort is normal. No respiratory distress. Breath sounds: Normal breath sounds. No wheezing or rales. Musculoskeletal:      Right lower leg: No edema. Left lower leg: No edema. Comments: R foot. FROM. No deformity or bony tenderness. There is fluctuant swelling R lateral mid foot, perhaps at 4th MT base. Gait normal.   Lymphadenopathy:      Cervical: No cervical adenopathy. Skin:     General: Skin is warm and dry. Neurological:      General: No focal deficit present. Mental Status: He is alert and oriented to person, place, and time. Mental status is at baseline. Psychiatric:         Mood and Affect: Mood normal.         Behavior: Behavior normal.         Thought Content: Thought content normal.         Judgment: Judgment normal.         Assessment:      1.  Essential hypertension  - Blood pressure is at goal on current therapy; continue meds and monitoring. Regular physical activity and healthy diet (low sodium, multiple servings of produce daily) was recommended. Renal function to be assessed yearly. - Comprehensive Metabolic Panel; Future    2. Mild persistent asthma without complication  - flaring up this fall. Advised to restart symbicort at BID dosing until hay fever season is past.    3. Right foot pain  - unsure about cause- ligament tear or occult fracture? Arthritis in cuboid-4th MT joint?  - check xrs and will have Dr Álvaro Cruz evaluate him  - XR FOOT RIGHT (MIN 3 VIEWS); Future  - Peña Han MD, Orthopedic Surgery (Foot, Ankle), [facility]    4. Screening, lipid  - Lipid Panel; Future          Plan: Fu 6 mo.         Thad Khan MD

## 2020-08-24 DIAGNOSIS — I10 ESSENTIAL HYPERTENSION: ICD-10-CM

## 2020-08-24 DIAGNOSIS — Z13.220 SCREENING, LIPID: ICD-10-CM

## 2020-08-24 LAB
A/G RATIO: 2.1 (ref 1.1–2.2)
ALBUMIN SERPL-MCNC: 4.6 G/DL (ref 3.4–5)
ALP BLD-CCNC: 74 U/L (ref 40–129)
ALT SERPL-CCNC: 17 U/L (ref 10–40)
ANION GAP SERPL CALCULATED.3IONS-SCNC: 11 MMOL/L (ref 3–16)
AST SERPL-CCNC: 23 U/L (ref 15–37)
BILIRUB SERPL-MCNC: 0.3 MG/DL (ref 0–1)
BUN BLDV-MCNC: 21 MG/DL (ref 7–20)
CALCIUM SERPL-MCNC: 9.5 MG/DL (ref 8.3–10.6)
CHLORIDE BLD-SCNC: 103 MMOL/L (ref 99–110)
CHOLESTEROL, TOTAL: 192 MG/DL (ref 0–199)
CO2: 27 MMOL/L (ref 21–32)
CREAT SERPL-MCNC: 1.2 MG/DL (ref 0.9–1.3)
GFR AFRICAN AMERICAN: >60
GFR NON-AFRICAN AMERICAN: >60
GLOBULIN: 2.2 G/DL
GLUCOSE BLD-MCNC: 99 MG/DL (ref 70–99)
HDLC SERPL-MCNC: 62 MG/DL (ref 40–60)
LDL CHOLESTEROL CALCULATED: 119 MG/DL
POTASSIUM SERPL-SCNC: 4.1 MMOL/L (ref 3.5–5.1)
SODIUM BLD-SCNC: 141 MMOL/L (ref 136–145)
TOTAL PROTEIN: 6.8 G/DL (ref 6.4–8.2)
TRIGL SERPL-MCNC: 57 MG/DL (ref 0–150)
VLDLC SERPL CALC-MCNC: 11 MG/DL

## 2020-08-26 ENCOUNTER — OFFICE VISIT (OUTPATIENT)
Dept: ORTHOPEDIC SURGERY | Age: 35
End: 2020-08-26
Payer: COMMERCIAL

## 2020-08-26 VITALS — BODY MASS INDEX: 37.77 KG/M2 | TEMPERATURE: 97.3 F | WEIGHT: 255 LBS | HEIGHT: 69 IN

## 2020-08-26 PROBLEM — M67.471 GANGLION CYST OF RIGHT FOOT: Status: ACTIVE | Noted: 2020-08-26

## 2020-08-26 PROCEDURE — 99243 OFF/OP CNSLTJ NEW/EST LOW 30: CPT | Performed by: ORTHOPAEDIC SURGERY

## 2020-08-26 NOTE — PROGRESS NOTES
Active member of club or organization: Not on file     Attends meetings of clubs or organizations: Not on file     Relationship status: Not on file    Intimate partner violence     Fear of current or ex partner: Not on file     Emotionally abused: Not on file     Physically abused: Not on file     Forced sexual activity: Not on file   Other Topics Concern    Not on file   Social History Narrative    Not on file       Family History   Problem Relation Age of Onset    Asthma Mother     Substance Abuse Father        Current Outpatient Medications on File Prior to Visit   Medication Sig Dispense Refill    hydroCHLOROthiazide (HYDRODIURIL) 25 MG tablet TAKE 1 TABLET BY MOUTH ONCE DAILY IN THE MORNING 30 tablet 0    SYMBICORT 160-4.5 MCG/ACT AERO Inhale 2 puffs by mouth twice daily 11 g 0    amLODIPine (NORVASC) 10 MG tablet Take 1 tablet by mouth once daily 30 tablet 0    losartan (COZAAR) 100 MG tablet Take 1 tablet by mouth once daily 30 tablet 0    albuterol sulfate HFA (VENTOLIN HFA) 108 (90 Base) MCG/ACT inhaler INHALE TWO PUFFS EVERY 6 HOURS AS NEEDED 18 g 1    albuterol (PROVENTIL) (2.5 MG/3ML) 0.083% nebulizer solution USE ONE VIAL IN NEBULIZER EVERY 6 HOURS AS NEEDED FOR WHEEZING AND FOR SHORTNESS OF BREATH 300 vial 0    Nebulizers (AIRIAL COMPACT MINI NEBULIZER) MISC Use prn for asthma 1 each 0    Respiratory Therapy Supplies (NEBULIZER/TUBING/MOUTHPIECE) KIT 1 kit by Does not apply route daily as needed 1 kit 0     No current facility-administered medications on file prior to visit. Pertinent items are noted in HPI  Review of systems reviewed from Patient History Form dated on 8/26/2020 and available in the patient's chart under the Media tab. No change noted. PHYSICAL EXAMINATION:  Mr. Qamar Julian is a very pleasant 30 y.o.  male who presents today in no acute distress, awake, alert, and oriented. He is well dressed, nourished and  groomed. Patient with normal affect.

## 2020-09-28 RX ORDER — LOSARTAN POTASSIUM 100 MG/1
TABLET ORAL
Qty: 90 TABLET | Refills: 1 | OUTPATIENT
Start: 2020-09-28 | End: 2021-03-19

## 2020-11-05 ENCOUNTER — TELEPHONE (OUTPATIENT)
Dept: FAMILY MEDICINE CLINIC | Age: 35
End: 2020-11-05

## 2020-11-05 NOTE — TELEPHONE ENCOUNTER
PT IS CALLING BECAUSE HIS SYMBICORT NAME BRAND IS NOT COVERED BY HIS INSURANCE  HIS INSURANCE WILL PAY FOR THE GENERIC BRAND OF SYMBICORT  PT IS OUT OF  [address] RIGHT NOW  PHARM CONFIRMED  [address] ON [facility]

## 2020-11-06 RX ORDER — BUDESONIDE AND FORMOTEROL FUMARATE DIHYDRATE 160; 4.5 UG/1; UG/1
2 AEROSOL RESPIRATORY (INHALATION) 2 TIMES DAILY
Qty: 1 INHALER | Refills: 5 | Status: SHIPPED | OUTPATIENT
Start: 2020-11-06 | End: 2021-08-16 | Stop reason: ALTCHOICE

## 2020-11-06 RX ORDER — ALBUTEROL SULFATE 90 UG/1
AEROSOL, METERED RESPIRATORY (INHALATION)
Qty: 18 G | Refills: 1 | Status: SHIPPED | OUTPATIENT
Start: 2020-11-06 | End: 2022-01-04

## 2020-11-11 ENCOUNTER — OFFICE VISIT (OUTPATIENT)
Dept: PRIMARY CARE CLINIC | Age: 35
End: 2020-11-11
Payer: COMMERCIAL

## 2020-11-11 ENCOUNTER — PATIENT MESSAGE (OUTPATIENT)
Dept: FAMILY MEDICINE CLINIC | Age: 35
End: 2020-11-11

## 2020-11-11 PROCEDURE — 99211 OFF/OP EST MAY X REQ PHY/QHP: CPT | Performed by: NURSE PRACTITIONER

## 2020-11-11 RX ORDER — PREDNISONE 20 MG/1
20 TABLET ORAL 2 TIMES DAILY
Qty: 10 TABLET | Refills: 0 | Status: SHIPPED | OUTPATIENT
Start: 2020-11-11 | End: 2020-11-16

## 2020-11-11 NOTE — PROGRESS NOTES
Stephen Palmer received a viral test for COVID-19. They were educated on isolation and quarantine as appropriate. For any symptoms, they were directed to seek care from their PCP, given contact information to establish with a doctor, directed to an urgent care or the emergency room.

## 2020-11-11 NOTE — TELEPHONE ENCOUNTER
From: Taina Garrido  To: Apple Weaver MD  Sent: 11/11/2020 5:28 AM EST  Subject: Prescription Question    My asthma is acting up [employer] changed there requirements for the coupon and I can no longer afford the symbicort. The pharmacist told me there is a generic version that is covered by my insurance. I called with this information about two weeks ago Im not sure if the nurse gave you my message. I also at this stage of my asthma attack I could use a steroid also if you could please call in a prescription. Thank you.

## 2020-11-14 LAB — SARS-COV-2, NAA: DETECTED

## 2020-11-17 ENCOUNTER — TELEPHONE (OUTPATIENT)
Dept: FAMILY MEDICINE CLINIC | Age: 35
End: 2020-11-17

## 2020-11-17 NOTE — TELEPHONE ENCOUNTER
Pt tested postive for covid  Tested last weds and got results over the weekend  Still fever 100 and chills  Body aching   WOP wants to know if there is anything that he can take or prescribed to take to make him more comfortable     Pharm is [facility] [address]

## 2020-11-17 NOTE — TELEPHONE ENCOUNTER
Spoke to [facility] and advised OK to take Tylenol for fever and aches. Are we advising against Ibuprofen or would it be OK to stagger?   He was prescribed round of steroids and breathing has been ok  She will let us know if his sx worsen

## 2020-11-17 NOTE — TELEPHONE ENCOUNTER
He is free to use tylenol 1000mg every 6 hyours and Ibuprofen 600 mg every 6 hours (can take together). This may help. We just need to know if he gets sicker and sicker.

## 2020-11-23 ENCOUNTER — OFFICE VISIT (OUTPATIENT)
Dept: PRIMARY CARE CLINIC | Age: 35
End: 2020-11-23
Payer: COMMERCIAL

## 2020-11-23 PROCEDURE — 99211 OFF/OP EST MAY X REQ PHY/QHP: CPT | Performed by: NURSE PRACTITIONER

## 2020-11-24 LAB — SARS-COV-2, NAA: DETECTED

## 2020-11-25 RX ORDER — ALBUTEROL SULFATE 2.5 MG/3ML
SOLUTION RESPIRATORY (INHALATION)
Qty: 900 ML | Refills: 0 | Status: SHIPPED | OUTPATIENT
Start: 2020-11-25 | End: 2022-06-02

## 2020-11-25 NOTE — RESULT ENCOUNTER NOTE
Your test came back detected/positive for Covid-19. *Left Message*  What happens if I have a positive test?  If you have symptoms:  Isolate until all three of these things are true: 1) your symptoms are better, 2) it has been 10 days since you first felt sick, and 3) you have had no fever for at least 24 hours without using medicine that lowers fever. Drink plenty of fluids and eat when you can. You may take medicine for pain or fever if you need to. Rest as much as you can. If you do not have symptoms:  Stay home for 10 days after the date you were tested. If you develop symptoms during those 10 days, stay home until all three of these things are true: 1) your symptoms are better, 2) it has been 10 days since you first felt sick, and 3) you have had no fever for at least 24 hours without using medicine that lowers fever. Follow care instructions from your doctor or other healthcare provider. Seek emergency medical care immediately if you have trouble breathing, persistent pain or pressure in the chest, new confusion, inability to wake or stay awake, or bluish lips or face. Someone from the health department (case investigator or contact tracer) may reach out to you to check on your health and ask about other people you have been around or where you've spent time while you may have been able to spread COVID-19 to others. This person's role is strictly to map the virus to help identify people who may have been exposed to the virus and prevent its spread. The local health department will also provide guidance on how to stay safely at home to avoid spreading illness. Detected results can happen for months and you are not considered contagious. No retest is necessary.

## 2020-11-30 ENCOUNTER — OFFICE VISIT (OUTPATIENT)
Dept: PRIMARY CARE CLINIC | Age: 35
End: 2020-11-30
Payer: COMMERCIAL

## 2020-11-30 PROCEDURE — 99211 OFF/OP EST MAY X REQ PHY/QHP: CPT | Performed by: NURSE PRACTITIONER

## 2020-11-30 NOTE — PROGRESS NOTES
Mace Mcburney received a viral test for COVID-19. They were educated on isolation and quarantine as appropriate. For any symptoms, they were directed to seek care from their PCP, given contact information to establish with a doctor, directed to an urgent care or the emergency room.

## 2020-12-01 LAB — SARS-COV-2, NAA: NOT DETECTED

## 2021-02-26 ENCOUNTER — OFFICE VISIT (OUTPATIENT)
Dept: ORTHOPEDIC SURGERY | Age: 36
End: 2021-02-26
Payer: COMMERCIAL

## 2021-02-26 VITALS
SYSTOLIC BLOOD PRESSURE: 153 MMHG | DIASTOLIC BLOOD PRESSURE: 101 MMHG | WEIGHT: 255 LBS | BODY MASS INDEX: 37.77 KG/M2 | TEMPERATURE: 95.7 F | HEIGHT: 69 IN

## 2021-02-26 DIAGNOSIS — M67.471 GANGLION CYST OF RIGHT FOOT: Primary | ICD-10-CM

## 2021-02-26 PROCEDURE — 99213 OFFICE O/P EST LOW 20 MIN: CPT | Performed by: ORTHOPAEDIC SURGERY

## 2021-02-26 NOTE — PROGRESS NOTES
CHIEF COMPLAINT: Right foot pain/ganglion cyst.    HISTORY:  Mr. Esa Coffey 29 y.o.  male presents today for follow-up of right lateral foot ganglion cyst.  He was initially seen on 8/26/2020 as a consultation request from Anthony Cruz MD for evaluation of right foot localized swelling with minimal pain which started around 2018.  He denies any pain. He states he did drop a gazebo on his foot on 2/24/2021 and had increased pain that lasted for 5 hours but then it resolved. No radiation and no numbness and tingling sensation. No other complaint. Past Medical History:   Diagnosis Date    Asthma     Hemorrhagic stroke ([address] 10/31/13    due to berry aneurysm, [facility]    Hypertension        Past Surgical History:   Procedure Laterality Date    BRAIN ANEURYSM SURGERY  11/1/13    [facility]; Dr Bradshaw    CSF SHUNT  11/1/13    ORCHIOPEXY Bilateral 10/5/2019    SCROTAL EXPLORATION, BILATERAL ORCHIOPEXY performed by Mee Najjar, MD at [address] [place] [address] Marital status:      Spouse name: wife Ciro Leija Number of children: 3    Years of education: Not on file    Highest education level: Not on file   Occupational History    Occupation: [employer]   Social Needs    Financial resource strain: Not on file    Food insecurity     Worry: Not on file     Inability: Not on file   [employer] needs     Medical: Not on file     Non-medical: Not on file   Tobacco Use    Smoking status: Never Smoker    Smokeless tobacco: Never Used   Substance and Sexual Activity    Alcohol use: Yes     Comment: hx alcoholism. drinks occasionally now, but only 1/day.     Drug use: No    Sexual activity: Yes     Partners: Female     Comment: Wife Enscott   Lifestyle    Physical activity     Days per week: Not on file     Minutes per session: Not on file    Stress: Not on file   Relationships    Social connections     Talks on phone: Not on file PHYSICAL EXAMINATION:  Mr. Aleksandar Benites is a very pleasant 29 y.o.  male who presents today in no acute distress, awake, alert, and oriented. He is well dressed, nourished and  groomed. Patient with normal affect. Height is  5' 9\" (1.753 m), weight is 255 lb (115.7 kg), Body mass index is 37.66 kg/m². Resting respiratory rate is 16. Examination of the gait, showed that the patient walks heel-toe with no limp.  Examination of both ankles showing dorsiflexion to about 10 degrees bilaterally, which increased with knee flexion. He has intact sensation and good pedal pulses.  He has good strength in all four planes, including eversion, and has no tenderness on deep palpation over the right lateral midfoot, with mild prominent cystic swelling c/w ganglion cyst compared to the other side.  The ankles are stable to drawer test bilaterally, equally.                IMAGING:  Xray's were reviewed.  3 views of the right foot taken in office 8/26/2020 and showed no acute fracture. IMPRESSION: Right lateral foot ganglion cyst.    PLAN:  I discussed with the patient the treatment options including both surgical and non-surgical treatment, and that my recommendation would be surgical excision if he develop more symptoms. F/U as needed.       Tereza Fortune MD

## 2021-03-03 ENCOUNTER — HOSPITAL ENCOUNTER (EMERGENCY)
Age: 36
Discharge: HOME | End: 2021-03-04
Payer: COMMERCIAL

## 2021-03-03 VITALS
RESPIRATION RATE: 16 BRPM | OXYGEN SATURATION: 100 % | TEMPERATURE: 98.5 F | BODY MASS INDEX: 37.66 KG/M2 | HEIGHT: 69 IN | SYSTOLIC BLOOD PRESSURE: 146 MMHG | DIASTOLIC BLOOD PRESSURE: 92 MMHG | HEART RATE: 74 BPM

## 2021-03-03 DIAGNOSIS — M25.561 ACUTE PAIN OF RIGHT KNEE: Primary | ICD-10-CM

## 2021-03-03 PROCEDURE — 99283 EMERGENCY DEPT VISIT LOW MDM: CPT

## 2021-03-04 ASSESSMENT — ENCOUNTER SYMPTOMS
SHORTNESS OF BREATH: 0
COLOR CHANGE: 0

## 2021-03-04 NOTE — ED PROVIDER NOTES
**ADVANCED PRACTICE PROVIDER, I HAVE EVALUATED THIS PATIENT**        [address] [facility]      Pt Name: Nilsa Manrique  PPZ:[id number]  [place] 1985  Date of evaluation: 3/3/2021  Provider: KRISTA Padilla CNP      Chief Complaint:    Chief Complaint   Patient presents with    Knee Pain     Right knee pain, no known injury. Went to get out of bed and movement of the right knee caused intense pain 10/10. Currently not in pain. Nursing Notes, Past Medical Hx, Past Surgical Hx, Social Hx, Allergies, and Family Hx were all reviewed and agreed with or any disagreements were addressed in the HPI.    HPI:  (Location, Duration, Timing, Severity, Quality, Assoc Sx, Context, Modifying factors)  This is a  29 y.o. male who presents to the emergency department today via EMS from home complaining of left knee pain that is since resolved. Onset was just prior to arrival.  The context was he stepped out of bed and felt like he had a charley horse on the inside of his right knee. His family has a history of vascular problems, and his grandfather recently had his legs amputated, so he was concerned and came to the ER. Pain is since resolved. No skin changes. No numbness or weakness.     PastMedical/Surgical History:      Diagnosis Date    Asthma     Hemorrhagic stroke ([address] 10/31/13    due to berry aneurysm, [facility]    Hypertension          Procedure Laterality Date    BRAIN ANEURYSM SURGERY  11/1/13    [facility]; Dr Bradshaw    CSF SHUNT  11/1/13    ORCHIOPEXY Bilateral 10/5/2019    SCROTAL EXPLORATION, BILATERAL ORCHIOPEXY performed by Kathryn Ordaz MD at [facility]        Medications:  Previous Medications    ALBUTEROL (PROVENTIL) (2.5 MG/3ML) 0.083% NEBULIZER SOLUTION    USE 1 VIAL IN NEBULIZER EVERY 6 HOURS AS NEEDED FOR WHEEZING AND FOR SHORTNESS OF BREATH    ALBUTEROL SULFATE HFA (VENTOLIN HFA) 108 (90 BASE) MCG/ACT INHALER    INHALE TWO PUFFS EVERY 6 HOURS AS NEEDED    AMLODIPINE (NORVASC) 10 MG TABLET    Take 1 tablet by mouth once daily    BUDESONIDE-FORMOTEROL (SYMBICORT) 160-4.5 MCG/ACT AERO    Inhale 2 puffs into the lungs 2 times daily    FLUTICASONE-SALMETEROL (ADVAIR DISKUS) 250-50 MCG/DOSE AEPB    Inhale 1 puff into the lungs every 12 hours    HYDROCHLOROTHIAZIDE (HYDRODIURIL) 25 MG TABLET    TAKE 1 TABLET BY MOUTH ONCE DAILY IN THE MORNING    LOSARTAN (COZAAR) 100 MG TABLET    Take 1 tablet by mouth once daily    NEBULIZERS (AIRIAL COMPACT MINI NEBULIZER) MISC    Use prn for asthma    RESPIRATORY THERAPY SUPPLIES (NEBULIZER/TUBING/MOUTHPIECE) KIT    1 kit by Does not apply route daily as needed         Review of Systems:  Review of Systems   Constitutional: Negative for diaphoresis. Respiratory: Negative for shortness of breath. Cardiovascular: Negative for chest pain. Musculoskeletal: Positive for arthralgias (right knee). Negative for gait problem and joint swelling. Skin: Negative for color change and wound. Allergic/Immunologic: Negative for immunocompromised state. Neurological: Negative for weakness and numbness. Psychiatric/Behavioral: Negative for confusion. All other systems reviewed and are negative. Positives and Pertinent negatives as per HPI. Except as noted above in the ROS, problem specific ROS was completed and is negative. Physical Exam:  Physical Exam  Vitals signs and nursing note reviewed. Constitutional:       General: He is not in acute distress. Appearance: Normal appearance. He is well-developed. He is not toxic-appearing. HENT:      Head: Normocephalic and atraumatic. Eyes:      General: No scleral icterus. Conjunctiva/sclera: Conjunctivae normal.   Neck:      Musculoskeletal: Normal range of motion. Vascular: No JVD. Cardiovascular:      Rate and Rhythm: Normal rate and regular rhythm. Pulses:           Popliteal pulses are 2+ on the right side.    Pulmonary: sitting in bed in no acute distress. He has no reproducible tenderness. Muscles are soft. Normal gait. No neurovascular deficits. Does not need crutches or x-ray. Given a referral to orthopedic surgery and discharged home in stable condition. At this time, the evidence for any other entities in the differential is insufficient to justify any further testing. This was explained to the patient. The patient was advised that persistent or worsening symptoms will require further evaluation. The patient tolerated their visit well. I evaluated the patient. The physician was available for consultation as needed. The patient and / or the family were informed of the results of any tests, a time was given to answer questions, a plan was proposed and they agreed with plan. CLINICAL IMPRESSION:  1.  Acute pain of right knee        DISPOSITION Decision To Discharge 03/03/2021 11:48:57 PM      PATIENT REFERRED TO:  Sri Soriano MD  [address]  [phone]    Schedule an appointment as soon as possible for a visit       Kody Thapa MD  [address] [facility] [address] [address]  [phone]    Schedule an appointment as soon as possible for a visit       [facility] Emergency Department  [address] [address]  [phone]  Go to   As needed      DISCHARGE MEDICATIONS:  New Prescriptions    No medications on file       DISCONTINUED MEDICATIONS:  Discontinued Medications    No medications on file              (Please note the MDM and HPI sections of this note were completed with a voice recognition program.  Efforts were made to edit the dictations but occasionally words are mis-transcribed.)    Electronically signed, KRISTA Griffin CNP,          KRISTA Griffin CNP  03/04/21 0014

## 2021-03-04 NOTE — ED NOTES
Bed: B-08  Expected date:   Expected time:   Means of arrival:   Comments:  [address] knee pain      Brigitte Solorio RN  03/03/21 3845

## 2021-03-11 ENCOUNTER — OFFICE VISIT (OUTPATIENT)
Dept: ORTHOPEDIC SURGERY | Age: 36
End: 2021-03-11
Payer: COMMERCIAL

## 2021-03-11 ENCOUNTER — CLINICAL DOCUMENTATION (OUTPATIENT)
Dept: ORTHOPEDIC SURGERY | Age: 36
End: 2021-03-11

## 2021-03-11 VITALS — WEIGHT: 255 LBS | HEIGHT: 69 IN | BODY MASS INDEX: 37.77 KG/M2

## 2021-03-11 DIAGNOSIS — M25.561 RIGHT KNEE PAIN, UNSPECIFIED CHRONICITY: Primary | ICD-10-CM

## 2021-03-11 PROCEDURE — 99214 OFFICE O/P EST MOD 30 MIN: CPT | Performed by: ORTHOPAEDIC SURGERY

## 2021-03-11 NOTE — Clinical Note
I had the pleasure of evaluating . Manjula Santoro in my office today. Thank you for allowing me to participate in their care. Please see the attached note for full details of the visit.

## 2021-03-11 NOTE — PROGRESS NOTES
3/11/2021     History of Present Illness: The patient is a 43-year-old male who presents for evaluation of his right knee. He reports getting out of bed and thinking he may have twisted his knee. He experienced immediate pain. Since then he has had persistent pain deep within the knee. He is to had difficulty bearing weight. He does report some swelling. He reports some catching and locking of the knee. 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, [facility]    Hypertension       Past Surgical History:   Procedure Laterality Date    BRAIN ANEURYSM SURGERY  11/1/13    [facility]; Dr Bradshaw    CSF SHUNT  11/1/13    ORCHIOPEXY Bilateral 10/5/2019    SCROTAL EXPLORATION, BILATERAL ORCHIOPEXY performed by Carolina Liao MD at [address] [place] [address] Street History   Problem Relation Age of Onset    Asthma Mother     Substance Abuse Father       Social History     Socioeconomic History    Marital status:      Spouse name: wife Fernando Barlow Number of children: 3    Years of education: Not on file    Highest education level: Not on file   Occupational History    Occupation: [employer]   Social Needs    Financial resource strain: Not on file    Food insecurity     Worry: Not on file     Inability: Not on file   [employer] needs     Medical: Not on file     Non-medical: Not on file   Tobacco Use    Smoking status: Never Smoker    Smokeless tobacco: Never Used   Substance and Sexual Activity    Alcohol use: Yes     Comment: hx alcoholism. drinks occasionally now, but only 1/day.     Drug use: Yes     Types: Marijuana    Sexual activity: Yes     Partners: Female     Comment: Wife Enjale   Lifestyle    Physical activity     Days per week: Not on file     Minutes per session: Not on file    Stress: Not on file   Relationships    Social connections     Talks on phone: Not on file     Gets together: Not on file Attends Caoda[employer] service: Not on file     Active member of club or organization: Not on file     Attends meetings of clubs or organizations: Not on file     Relationship status: Not on file    Intimate partner violence     Fear of current or ex partner: Not on file     Emotionally abused: Not on file     Physically abused: Not on file     Forced sexual activity: Not on file   Other Topics Concern    Not on file   Social History Narrative    Not on file      Current Outpatient Medications on File Prior to Visit   Medication Sig Dispense Refill    albuterol (PROVENTIL) (2.5 MG/3ML) 0.083% nebulizer solution USE 1 VIAL IN NEBULIZER EVERY 6 HOURS AS NEEDED FOR WHEEZING AND FOR SHORTNESS OF BREATH 900 mL 0    fluticasone-salmeterol (ADVAIR DISKUS) 250-50 MCG/DOSE AEPB Inhale 1 puff into the lungs every 12 hours 60 each 3    budesonide-formoterol (SYMBICORT) 160-4.5 MCG/ACT AERO Inhale 2 puffs into the lungs 2 times daily 1 Inhaler 5    albuterol sulfate HFA (VENTOLIN HFA) 108 (90 Base) MCG/ACT inhaler INHALE TWO PUFFS EVERY 6 HOURS AS NEEDED 18 g 1    amLODIPine (NORVASC) 10 MG tablet Take 1 tablet by mouth once daily 30 tablet 5    losartan (COZAAR) 100 MG tablet Take 1 tablet by mouth once daily 90 tablet 1    hydroCHLOROthiazide (HYDRODIURIL) 25 MG tablet TAKE 1 TABLET BY MOUTH ONCE DAILY IN THE MORNING 30 tablet 5    Nebulizers (AIRIAL COMPACT MINI NEBULIZER) MISC Use prn for asthma 1 each 0    Respiratory Therapy Supplies (NEBULIZER/TUBING/MOUTHPIECE) KIT 1 kit by Does not apply route daily as needed 1 kit 0     No current facility-administered medications on file prior to visit. No Known Allergies     Review of Systems:  Constitutional: Patient is adequately groomed with no evidence of malnutrition  Mental Status: The patient is oriented to time, place and person. The patient's mood and affect are appropriate.   Lymphatic: The lymphatic examination bilaterally reveals all areas to be without enlargement or induration. Vascular: Examination reveals no swelling or calf tenderness. Peripheral pulses are palpable and 2+. Neurological: The patient has good coordination. There is no weakness or sensory deficit. Skin:  Head/Neck: inspection reveals no rashes, ulcerations or lesions. Trunk: inspection reveals no rashes, ulcerations or lesions. Objective:  Ht 5' 9\" (1.753 m)   Wt 255 lb (115.7 kg)   BMI 37.66 kg/m²      Physical Exam:  Knee Examination:    Inspection:    No abrasions no lacerations no signs of infection or obvious deformity     Palpation:     Palpation reveals +  Pain medial joint line, neg lateral joint line pain,  small joint effusion    Range of Motion:   0 to 130 degrees    Strength:   Quadriceps testing 5/5, hamstring muscle testing 5/5, EHL against resistance is 5/5, hip flexor strength is intact 5/5, internal and external rotation of the hip against resistance is also intact 5/5    Special Tests:   neg Lachman, neg pivot shift, neg Renetta's, no posterior sag no posterior drawer does not open to valgus or varus stress at 0 or 30°, Homans negative, pedal pulses are +2/4, capillary refill is brisk, sensation is intact, ankle exam and hip exam are shows no pain with full range of motion, provocative testing of the hip is nonpainful, muscle testing around the hip is 5 over 5.   Lumbar flexion to 6 inches from floor without pain    Gait: antalgic    Reflex:    Lower extremity Deep tendon reflexes +2/4 and equal bilaterally for patella and Achilles  Upper extremity reflexes:  of the biceps, triceps, brachioradialis +2/4 equal bilaterally    Contralateral  Knee:   Negative Lachman negative anterior drawer negative pivot shift no posterior sag no posterior drawer does not open to valgus or varus stress at 0 or 30°, negative Renetta's, negative Homans, pedal pulses are +2/4 capillary refill is brisk sensation is intact, ankle exam and hip exam are shows no pain with full range of motion, provocative testing of the hip is nonpainful, muscle testing around the hip is 5 over 5. Hip and lumbar testing does not reproduce pain evocative testing does not reproduce symptomatology. Imagin view x-rays of the right knee were reviewed and obtained in the office today on 3/11/2021. There is no fracture or dislocation. Subtle lucency of the femoral trochlea on the merchant view which may be indicative of osteochondral defect. Comparison x-rays of the left knee were reviewed and demonstrates no evidence of abnormality. Assessment:  Right knee pain following injury. Concern for osteochondral defect versus meniscal tear versus other condition    Plan:  The patient the differential diagnosis. At this point given the acuity of the injury combined with his difficulty bearing weight and mechanical symptoms we will proceed with an MRI. He is in agreement with the plan. Following the study he will return to review the results in the office. Electronically signed by Lor Vega MD on 3/11/21 at 12:19 PM EST     Disclaimer: This note was dictated with voice recognition software. Though review and correction are routine, we apologize for any errors.

## 2021-03-12 ENCOUNTER — CLINICAL DOCUMENTATION (OUTPATIENT)
Dept: ORTHOPEDIC SURGERY | Age: 36
End: 2021-03-12

## 2021-03-12 NOTE — PROGRESS NOTES
MRI RT KNEE APPROVED. Rodrick Mckeon # [id number] .  DATE RANGE 03/11/2021-05/09/2021    Faxed to [facility]

## 2021-03-18 ENCOUNTER — HOSPITAL ENCOUNTER (EMERGENCY)
Age: 36
Discharge: HOME | End: 2021-03-19
Attending: EMERGENCY MEDICINE
Payer: COMMERCIAL

## 2021-03-18 DIAGNOSIS — T78.40XA ALLERGIC REACTION, INITIAL ENCOUNTER: Primary | ICD-10-CM

## 2021-03-18 PROCEDURE — 99285 EMERGENCY DEPT VISIT HI MDM: CPT

## 2021-03-18 RX ORDER — DIPHENHYDRAMINE HCL 25 MG
25 TABLET ORAL ONCE
Status: COMPLETED | OUTPATIENT
Start: 2021-03-18 | End: 2021-03-19

## 2021-03-18 RX ORDER — PREDNISONE 20 MG/1
60 TABLET ORAL ONCE
Status: COMPLETED | OUTPATIENT
Start: 2021-03-18 | End: 2021-03-19

## 2021-03-19 VITALS — HEART RATE: 62 BPM | OXYGEN SATURATION: 98 % | RESPIRATION RATE: 18 BRPM | TEMPERATURE: 97 F

## 2021-03-19 PROCEDURE — 6370000000 HC RX 637 (ALT 250 FOR IP): Performed by: EMERGENCY MEDICINE

## 2021-03-19 RX ORDER — PREDNISONE 50 MG/1
50 TABLET ORAL DAILY
Qty: 5 TABLET | Refills: 0 | Status: SHIPPED | OUTPATIENT
Start: 2021-03-19 | End: 2021-03-24

## 2021-03-19 RX ADMIN — PREDNISONE 60 MG: 20 TABLET ORAL at 00:02

## 2021-03-19 RX ADMIN — DIPHENHYDRAMINE HCL 25 MG: 25 TABLET ORAL at 00:02

## 2021-03-19 ASSESSMENT — ENCOUNTER SYMPTOMS
ABDOMINAL DISTENTION: 0
VOMITING: 0
APNEA: 0
DIARRHEA: 0
EYE REDNESS: 0
SHORTNESS OF BREATH: 0
EYE DISCHARGE: 0
WHEEZING: 0
NAUSEA: 0
FACIAL SWELLING: 1
PHOTOPHOBIA: 0
RECTAL PAIN: 0
CHEST TIGHTNESS: 0
EYE PAIN: 0
CHOKING: 0
BACK PAIN: 0
STRIDOR: 0
BLOOD IN STOOL: 0
ANAL BLEEDING: 0
CONSTIPATION: 0
EYE ITCHING: 0
ABDOMINAL PAIN: 0
COUGH: 0
COLOR CHANGE: 0

## 2021-03-19 NOTE — ED PROVIDER NOTES
[address] [facility]        Pt Name: Jaylan Savage  MRN: [id number]  [place] 1985  Date of evaluation: 3/18/2021  Provider: KRISTA Sesay - CNP  PCP: Jaron Fulton MD     I have seen and evaluated this patient with my supervising physician Denzel Marte MD.    [address] [place] [address]       Chief Complaint   Patient presents with    Allergic Reaction       HISTORY OF PRESENT ILLNESS   (Location, Timing/Onset, Context/Setting, Quality, Duration, Modifying Factors, Severity, Associated Signs and Symptoms)  Note limiting factors. Jaylan Savage is a 29 y.o. male who presents to the emergency department today complaining of tongue swelling. Onset was shortly prior to arrival.  Symptoms started spontaneously and have been constant. No drooling or difficulty handling secretions. No stridor shortness of breath. No neck stiffness or pain. He takes losartan and has a history of allergic rhinitis and asthma. Nursing Notes were all reviewed and agreed with or any disagreements were addressed in the HPI. REVIEW OF SYSTEMS    (2-9 systems for level 4, 10 or more for level 5)     Review of Systems   Constitutional: Negative for chills, diaphoresis, fatigue and fever. HENT: Positive for facial swelling (tongue). Respiratory: Negative for cough and shortness of breath. Cardiovascular: Negative for chest pain. Gastrointestinal: Negative for abdominal pain, diarrhea, nausea and vomiting. Musculoskeletal: Negative for neck pain and neck stiffness. Skin: Negative for color change and rash. Allergic/Immunologic: Negative for immunocompromised state. Neurological: Negative for dizziness and headaches. Hematological: Negative for adenopathy. Psychiatric/Behavioral: Negative for confusion. All other systems reviewed and are negative. Positives and Pertinent negatives as per HPI.   Except as noted above in the ROS, all other systems were reviewed and negative. PAST MEDICAL HISTORY     Past Medical History:   Diagnosis Date    Asthma     Hemorrh[address] ([facility] [address] 10/31/13    due to berry aneurysm, [facility]    Hypertension          SURGICAL HISTORY     Past Surgical History:   Procedure Laterality Date    BRAIN ANEURYSM SURGERY  11/1/13    [facility]; Dr Bradshaw    CSF SHUNT  11/1/13    ORCHIOPEXY Bilateral 10/5/2019    SCROTAL EXPLORATION, BILATERAL ORCHIOPEXY performed by Adrianna Scanlon MD at [facility]       Previous Medications    ALBUTEROL (PROVENTIL) (2.5 MG/3ML) 0.083% NEBULIZER SOLUTION    USE 1 VIAL IN NEBULIZER EVERY 6 HOURS AS NEEDED FOR WHEEZING AND FOR SHORTNESS OF BREATH    ALBUTEROL SULFATE HFA (VENTOLIN HFA) 108 (90 BASE) MCG/ACT INHALER    INHALE TWO PUFFS EVERY 6 HOURS AS NEEDED    AMLODIPINE (NORVASC) 10 MG TABLET    Take 1 tablet by mouth once daily    BUDESONIDE-FORMOTEROL (SYMBICORT) 160-4.5 MCG/ACT AERO    Inhale 2 puffs into the lungs 2 times daily    FLUTICASONE-SALMETEROL (ADVAIR DISKUS) 250-50 MCG/DOSE AEPB    Inhale 1 puff into the lungs every 12 hours    HYDROCHLOROTHIAZIDE (HYDRODIURIL) 25 MG TABLET    TAKE 1 TABLET BY MOUTH ONCE DAILY IN THE MORNING    NEBULIZERS (AIRIAL COMPACT MINI NEBULIZER) MISC    Use prn for asthma    RESPIRATORY THERAPY SUPPLIES (NEBULIZER/TUBING/MOUTHPIECE) KIT    1 kit by Does not apply route daily as needed         ALLERGIES     Patient has no known allergies. FAMILYHISTORY       Family History   Problem Relation Age of Onset    Asthma Mother     Substance Abuse Father           SOCIAL HISTORY       Social History     Tobacco Use    Smoking status: Never Smoker    Smokeless tobacco: Never Used   Substance Use Topics    Alcohol use: Yes     Comment: hx alcoholism. drinks occasionally now, but only 1/day.     Drug use: Yes     Types: Marijuana       SCREENINGS             PHYSICAL EXAM    (up to 7 for level 4, 8 or more for level 5) ED Triage Vitals [03/18/21 2305]   BP Temp Temp Source Pulse Resp SpO2 Height Weight   -- 97 °F (36.1 °C) Temporal 101 22 98 % -- --       Physical Exam  Vitals signs and nursing note reviewed. Constitutional:       General: He is not in acute distress. Appearance: Normal appearance. He is well-developed. He is not toxic-appearing. HENT:      Head: Normocephalic and atraumatic. Mouth/Throat:      Lips: Pink. Mouth: Mucous membranes are moist.      Pharynx: Oropharynx is clear. Eyes:      General: No scleral icterus. Conjunctiva/sclera: Conjunctivae normal.   Neck:      Musculoskeletal: Normal range of motion and neck supple. No neck rigidity. Vascular: No JVD. Cardiovascular:      Rate and Rhythm: Normal rate and regular rhythm. Heart sounds: Normal heart sounds. Pulmonary:      Effort: Pulmonary effort is normal. No respiratory distress. Breath sounds: Normal breath sounds. Abdominal:      General: There is no distension. Palpations: Abdomen is soft. Abdomen is not rigid. Tenderness: There is no abdominal tenderness. Musculoskeletal: Normal range of motion. Skin:     General: Skin is warm and dry. Findings: No rash. Neurological:      General: No focal deficit present. Mental Status: He is alert and oriented to person, place, and time. Psychiatric:         Mood and Affect: Mood normal.         DIAGNOSTIC RESULTS   LABS:    Labs Reviewed - No data to display    All other labs were within normal range or not returned as of this dictation. EKG: All EKG's are interpreted by the Emergency Department Physician in the absence of a cardiologist.  Please see their note for interpretation of EKG.       RADIOLOGY:   Non-plain film images such as CT, Ultrasound and MRI are read by the radiologist. Plain radiographic images are visualized and preliminarily interpreted by the ED Provider with the below findings:        Interpretation per the Radiologist below, if available at the time of this note:    No orders to display     No results found. PROCEDURES   Unless otherwise noted below, none     Procedures    CRITICAL CARE TIME   N/A    CONSULTS:  None      EMERGENCY DEPARTMENT COURSE and DIFFERENTIAL DIAGNOSIS/MDM:   Vitals:    Vitals:    03/18/21 2305 03/19/21 0015   Pulse: 101 62   Resp: 22 18   Temp: 97 °F (36.1 °C) 97 °F (36.1 °C)   TempSrc: Temporal Oral   SpO2: 98%        Patient was given the following medications:  Medications   predniSONE (DELTASONE) tablet 60 mg (60 mg Oral Given 3/19/21 0002)   diphenhydrAMINE (BENADRYL) tablet 25 mg (25 mg Oral Given 3/19/21 0002)           Differential Diagnosis: Anaphylaxis, Angioedema, Allergic dermatitis, Bacterial etiology, Fungal etiology, Urticaria, Erythema Multiforme, Anxiety, Cardiac arrhythmia, other    Patient seen and examined today for tongue swelling. See HPI for patient presentation. Patient is hemodynamically stable, nontoxic, afebrile, and without tachycardia, tachypnea, and hypoxia. Physical exam as above. 61-year-old lying in bed in no acute distress. No obvious tongue swelling. Airway is patent. No stridor or wheezing. Patient given Benadryl and steroids. Monitored for 2 hours. Condition improved. Will be discharged home with steroids and instructions not to take the losartan to follow-up with his PCP in the morning. The patient tolerated their visit well. They were seen and evaluated by the attending physician, Sally Cortés MD who agreed with the assessment and plan. The patient and / or the family were informed of the results of any tests, a time was given to answer questions, a plan was proposed and they agreed with plan. FINAL IMPRESSION      1.  Allergic reaction, initial encounter          DISPOSITION/PLAN   DISPOSITION Decision To Discharge 03/19/2021 12:54:47 AM      PATIENT REFERREDTO:  Girish Moreno MD  [address] [place] [address] [address]    Call today        DISCHARGE MEDICATIONS:  New Prescriptions    PREDNISONE (DELTASONE) 50 MG TABLET    Take 1 tablet by mouth daily for 5 days       DISCONTINUED MEDICATIONS:  Discontinued Medications    LOSARTAN (COZAAR) 100 MG TABLET    Take 1 tablet by mouth once daily              (Please note that portions of this note were completed with a voice recognition program.  Efforts were made to edit the dictations but occasionally words are mis-transcribed.)    Dennie Leeds, APRN - CNP (electronically signed)           Dennie Leeds, APRN - CNP  03/19/21 9127

## 2021-03-19 NOTE — ED PROVIDER NOTES
Hematological: Negative for adenopathy. Does not bruise/bleed easily. Psychiatric/Behavioral: Negative for agitation, behavioral problems, confusion and decreased concentration. Except as noted above the remainder of the review of systems was reviewed and negative.      PAST MEDICAL HISTORY     Past Medical History:   Diagnosis Date    Asthma     Hemorrhagic stroke ([facility] [address] 10/31/13    due to berry aneurysm, [facility]    Hypertension        SURGICAL HISTORY       Past Surgical History:   Procedure Laterality Date    BRAIN ANEURYSM SURGERY  11/1/13    [facility]; Dr Bradshaw    CSF SHUNT  11/1/13    ORCHIOPEXY Bilateral 10/5/2019    SCROTAL EXPLORATION, BILATERAL ORCHIOPEXY performed by Patrick Guerrero MD at [address]       Discharge Medication List as of 3/19/2021 12:58 AM      CONTINUE these medications which have NOT CHANGED    Details   albuterol (PROVENTIL) (2.5 MG/3ML) 0.083% nebulizer solution USE 1 VIAL IN NEBULIZER EVERY 6 HOURS AS NEEDED FOR WHEEZING AND FOR SHORTNESS OF BREATH, Disp-900 mL, R-0Normal      fluticasone-salmeterol (ADVAIR DISKUS) 250-50 MCG/DOSE AEPB Inhale 1 puff into the lungs every 12 hours, Disp-60 each, R-3Normal      budesonide-formoterol (SYMBICORT) 160-4.5 MCG/ACT AERO Inhale 2 puffs into the lungs 2 times daily, Disp-1 Inhaler, R-5Normal      albuterol sulfate HFA (VENTOLIN HFA) 108 (90 Base) MCG/ACT inhaler INHALE TWO PUFFS EVERY 6 HOURS AS NEEDED, Disp-18 g, R-1Normal      amLODIPine (NORVASC) 10 MG tablet Take 1 tablet by mouth once daily, Disp-30 tablet,R-5Normal      hydroCHLOROthiazide (HYDRODIURIL) 25 MG tablet TAKE 1 TABLET BY MOUTH ONCE DAILY IN THE MORNING, Disp-30 tablet,R-5Normal      Nebulizers (AIRIAL COMPACT MINI NEBULIZER) MISC Disp-1 each, R-0, NormalUse prn for asthma      Respiratory Therapy Supplies (NEBULIZER/TUBING/MOUTHPIECE) KIT DAILY PRN Starting 1/18/2016, Until Discontinued, Disp-1 kit, R-0, Print             ALLERGIES Appearance: He is well-developed. He is not diaphoretic. HENT:      Head: Normocephalic and atraumatic. Mouth/Throat:      Comments: Very minor tongue swelling. Otherwise airway patent. Uvula midline. Clear to auscultation bilaterally lungs. Patient is well-appearing nontoxic. No erythema or swelling to the back of the throat noted. Eyes:      General:         Right eye: No discharge. Left eye: No discharge. Pupils: Pupils are equal, round, and reactive to light. Neck:      Musculoskeletal: Normal range of motion. Thyroid: No thyromegaly. Trachea: No tracheal deviation. Cardiovascular:      Rate and Rhythm: Normal rate and regular rhythm. Heart sounds: No murmur. Pulmonary:      Breath sounds: No wheezing or rales. Chest:      Chest wall: No tenderness. Abdominal:      General: There is no distension. Palpations: Abdomen is soft. There is no mass. Tenderness: There is no abdominal tenderness. There is no guarding or rebound. Musculoskeletal: Normal range of motion. General: No tenderness or deformity. Skin:     General: Skin is warm. Coloration: Skin is not pale. Findings: No erythema or rash. Neurological:      Mental Status: He is alert and oriented to person, place, and time. Cranial Nerves: No cranial nerve deficit. Motor: No abnormal muscle tone.       Coordination: Coordination normal.   Psychiatric:         Mood and Affect: Mood normal.         Behavior: Behavior normal.         DIAGNOSTIC RESULTS     EKG: All EKG's are interpreted by the Emergency Department Physician who either signs or Co-signs this chart in the absence of acardiologist.    None    RADIOLOGY:   Non-plain film images such as CT, Ultrasoundand MRI are read by the radiologist. Plain radiographic images are visualized and preliminarily interpreted by the emergency physician with the below findings:    None    ED BEDSIDE ULTRASOUND:   Performed by ED Physician - none    LABS:  Labs Reviewed - No data to display    All other labs were withinnormal range or not returned as of this dictation. EMERGENCY DEPARTMENT COURSE and DIFFERENTIAL DIAGNOSIS/MDM:     PMH, Surgical Hx, FH, Social Hx reviewed by myself (ETOH usage, Tobacco usage, Drug usage reviewed by myself, no pertinent Hx)- No Pertinent Hx     Old records were reviewed by me    Patient was observed in the emergency room. Prednisone given with significant improvement. Patient wants to go home. I told him he needs to stop his losartan. Call PCP tomorrow to have blood pressure checked and possibly new blood pressure medication started. I estimate there is LOW risk for Sepsis, MI, Stroke, Tamponade, PTX, Toxicity or other life threatening etiology thus I consider the discharge disposition reasonable. The patient is at low risk for mortality based on demographic, history and clinical factors. Given the best available information and clinical assessment, I estimate the risk of hospitalization to be greater than risk of treatment at home. I have explained to the patient that the risk could rapidly change, given precautions for return and instructions. Explained to patient that the risk for mortality is low based on demographic, history and clinical factors. I discussed with patient the results of evaluation in the ED, diagnosis, care, and prognosis. The plan is to discharge to home. Patient is in agreement with plan and questions have been answered. I also discussed with patient the reasons which may require a return visit and the importance of follow-up care. The patient is well-appearing, nontoxic, and improved at the time of discharge. Patient agrees to call to arrange follow-up care as directed. Patient understands to return immediately for worsening/change in symptoms.        CRITICAL CARE TIME   Total Critical Caretime was 0 minutes, excluding separately reportable procedures. There was a high probability of clinically significant/life threatening deterioration in the patient's condition which required my urgent intervention. PROCEDURES:  Unlessotherwise noted below, none    FINAL IMPRESSION      1.  Allergic reaction, initial encounter          DISPOSITION/PLAN   DISPOSITION Decision To Discharge 03/19/2021 12:54:47 AM    PATIENT REFERRED TO:  Michelle Carrero MD  [id number]  [address] [place] [address]  [phone]    Call today        DISCHARGE MEDICATIONS:  Discharge Medication List as of 3/19/2021 12:58 AM      START taking these medications    Details   predniSONE (DELTASONE) 50 MG tablet Take 1 tablet by mouth daily for 5 days, Disp-5 tablet, R-0Print                (Please note that portions ofthis note were completed with a voice recognition program.  Efforts were made to edit the dictations but occasionally words are mis-transcribed.)    Jamie Grant MD(electronically signed)  Attending Emergency Physician         Jamie Grant MD  03/19/21 [id number]

## 2021-03-22 ENCOUNTER — OFFICE VISIT (OUTPATIENT)
Dept: ORTHOPEDIC SURGERY | Age: 36
End: 2021-03-22
Payer: COMMERCIAL

## 2021-03-22 VITALS — HEIGHT: 69 IN | WEIGHT: 255 LBS | BODY MASS INDEX: 37.77 KG/M2

## 2021-03-22 DIAGNOSIS — M25.561 RIGHT KNEE PAIN, UNSPECIFIED CHRONICITY: Primary | ICD-10-CM

## 2021-03-22 DIAGNOSIS — M23.8X1 CHONDRAL DEFECT OF RIGHT PATELLA: ICD-10-CM

## 2021-03-22 PROCEDURE — 99215 OFFICE O/P EST HI 40 MIN: CPT | Performed by: ORTHOPAEDIC SURGERY

## 2021-03-22 PROCEDURE — 20610 DRAIN/INJ JOINT/BURSA W/O US: CPT | Performed by: ORTHOPAEDIC SURGERY

## 2021-03-22 RX ORDER — METHYLPREDNISOLONE ACETATE 40 MG/ML
40 INJECTION, SUSPENSION INTRA-ARTICULAR; INTRALESIONAL; INTRAMUSCULAR; SOFT TISSUE ONCE
Status: COMPLETED | OUTPATIENT
Start: 2021-03-22 | End: 2021-03-22

## 2021-03-22 RX ORDER — BUPIVACAINE HYDROCHLORIDE 5 MG/ML
4 INJECTION, SOLUTION PERINEURAL ONCE
Status: COMPLETED | OUTPATIENT
Start: 2021-03-22 | End: 2021-03-22

## 2021-03-22 RX ADMIN — BUPIVACAINE HYDROCHLORIDE 20 MG: 5 INJECTION, SOLUTION PERINEURAL at 15:31

## 2021-03-22 RX ADMIN — METHYLPREDNISOLONE ACETATE 40 MG: 40 INJECTION, SUSPENSION INTRA-ARTICULAR; INTRALESIONAL; INTRAMUSCULAR; SOFT TISSUE at 15:31

## 2021-03-22 NOTE — PROGRESS NOTES
3/22/2021     History of Present Illness: The patient is a 71-year-old male who presents for evaluation of his right knee. He reports getting out of bed and thinking he may have twisted his knee. He experienced immediate pain. Since then he has had persistent pain deep within the knee. He is to had difficulty bearing weight. He does report some swelling. He reports some catching and locking of the knee. At the last visit we did elect to proceed with an MRI. He is here to review the results and discuss treatment options. Objective:  Ht 5' 9\" (1.753 m)   Wt 255 lb (115.7 kg)   BMI 37.66 kg/m²      Physical Exam:  Knee Examination:    Inspection:    No abrasions no lacerations no signs of infection or obvious deformity     Palpation:     Palpation reveals +  Pain medial joint line, neg lateral joint line pain,  small joint effusion    Range of Motion:   0 to 130 degrees    Strength:   Quadriceps testing 5/5, hamstring muscle testing 5/5, EHL against resistance is 5/5, hip flexor strength is intact 5/5, internal and external rotation of the hip against resistance is also intact 5/5    Special Tests:   neg Lachman, neg pivot shift, neg Renetta's, no posterior sag no posterior drawer does not open to valgus or varus stress at 0 or 30°, Homans negative, pedal pulses are +2/4, capillary refill is brisk, sensation is intact, ankle exam and hip exam are shows no pain with full range of motion, provocative testing of the hip is nonpainful, muscle testing around the hip is 5 over 5.   Lumbar flexion to 6 inches from floor without pain    Gait: antalgic    Reflex:    Lower extremity Deep tendon reflexes +2/4 and equal bilaterally for patella and Achilles  Upper extremity reflexes:  of the biceps, triceps, brachioradialis +2/4 equal bilaterally    Contralateral  Knee:   Negative Lachman negative anterior drawer negative pivot shift no posterior sag no posterior drawer does not open to valgus or varus stress at 0 or 30°, negative Renetta's, negative Homans, pedal pulses are +2/4 capillary refill is brisk sensation is intact, ankle exam and hip exam are shows no pain with full range of motion, provocative testing of the hip is nonpainful, muscle testing around the hip is 5 over 5. Hip and lumbar testing does not reproduce pain evocative testing does not reproduce symptomatology. Imagin view x-rays of the right knee were reviewed and obtained in the office today on 3/11/2021. There is no fracture or dislocation. Subtle lucency of the femoral trochlea on the merchant view which may be indicative of osteochondral defect. Comparison x-rays of the left knee were reviewed and demonstrates no evidence of abnormality. MRI the right knee was reviewed. 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 Underlying small cystic changes are present. Chondral fissuring of the lateral facet of the patella is present. Assessment:  Right knee pain following injury. MRI reveals full-thickness chondral defect of the lateral trochlea    Plan:  I had a long discussion with the patient. We spent time reviewing MRI findings. We discussed operative and nonoperative treatment options. Nonoperative treatment options include activity modification, anti-inflammatory medications, cortisone injection, physical therapy. Surgical options in this case would include right knee arthroscopy with possible chondroplasty and possible cartilage biopsy which could be later used for cartilage restoration. We thoroughly discussed the risk and benefits associate with all these options. At this point I do recommend initial nonoperative management. He is in agreement. He opted for a cortisone injection. This was given via sterile technique and he tolerated well. The injection consisted of 1 mL of 40 mg Depo-Medrol combined with 4 mL of 0.5% Marcaine. We also gave a prescription for diclofenac as well as physical therapy. He will return in 6 weeks to assess his response. I spent greater than 40 minutes with this encounter. Time was spent reviewing the patient's MRI prior to arrival, reviewing the MRI in the office with him at the time of visit, discussing treatment options including surgical intervention, administering a cortisone injection, discussing physical therapy as well as counseling on next steps. Disclaimer: This note was dictated with voice recognition software. Though review and correction are routine, we apologize for any errors.

## 2021-04-01 ENCOUNTER — NURSE ONLY (OUTPATIENT)
Dept: FAMILY MEDICINE CLINIC | Age: 36
End: 2021-04-01

## 2021-04-01 ENCOUNTER — PATIENT MESSAGE (OUTPATIENT)
Dept: FAMILY MEDICINE CLINIC | Age: 36
End: 2021-04-01

## 2021-04-01 DIAGNOSIS — I10 ESSENTIAL HYPERTENSION: Primary | ICD-10-CM

## 2021-04-01 RX ORDER — TRIAMTERENE AND HYDROCHLOROTHIAZIDE 37.5; 25 MG/1; MG/1
1 TABLET ORAL DAILY
Qty: 30 TABLET | Refills: 0 | Status: SHIPPED | OUTPATIENT
Start: 2021-04-01 | End: 2021-04-13 | Stop reason: ALTCHOICE

## 2021-04-01 NOTE — TELEPHONE ENCOUNTER
Would you want to see him before the middle of May? If so do you want me to put him in a SD appt slot to be seen in 2 weeks?

## 2021-04-01 NOTE — TELEPHONE ENCOUNTER
Patient came into office with elevated blood pressure. Repeated blood pressure and it was 150/100. Patient denies headaches, dizziness, visual changes. Was recently taken off losartan due to angioedema. Swelling has resolved. He continues on amlodipine 10 mg and hydrochlorothiazide 25 mg daily. Reviewed Dr. Jaimes Peers note. Will start him on Maxzide 37.5-25 mg daily. Advised that he should not take his other hydrochlorothiazide medicine as the Maxzide is a combination pill. Advised to continue monitoring blood pressure daily. Advised if he develops a significant headache, dizziness, visual changes over the weekend he should go to ER for evaluation. Advised not to drink alcohol, take NSAIDs, smoke. Advised low-salt diet. Advised to follow-up in office early next week for repeat blood pressure check. Patient also reports that he has had increased stress with his 15year-old daughter. He denies depression or suicidal thoughts. Discussed starting counseling, but he was not open to that. Can further discuss at follow-up visits.

## 2021-04-01 NOTE — TELEPHONE ENCOUNTER
From: Ever Botello  To: Jarrod Ambriz MD  Sent: 4/1/2021 5:18 AM EDT  Subject: Prescription Question    I recently went to the ER with a diagnosis of possible angioedema caused by my lisinopril I need to request a new BP medication I was ordered to stop taking the lisinopril.

## 2021-04-06 ENCOUNTER — OFFICE VISIT (OUTPATIENT)
Dept: FAMILY MEDICINE CLINIC | Age: 36
End: 2021-04-06
Payer: COMMERCIAL

## 2021-04-06 VITALS
SYSTOLIC BLOOD PRESSURE: 140 MMHG | HEIGHT: 69 IN | BODY MASS INDEX: 34.39 KG/M2 | DIASTOLIC BLOOD PRESSURE: 100 MMHG | RESPIRATION RATE: 14 BRPM | HEART RATE: 82 BPM | WEIGHT: 232.2 LBS | TEMPERATURE: 97.3 F | OXYGEN SATURATION: 97 %

## 2021-04-06 DIAGNOSIS — I10 ESSENTIAL HYPERTENSION: Primary | ICD-10-CM

## 2021-04-06 DIAGNOSIS — I10 ESSENTIAL HYPERTENSION: ICD-10-CM

## 2021-04-06 LAB
ANION GAP SERPL CALCULATED.3IONS-SCNC: 12 MMOL/L (ref 3–16)
BUN BLDV-MCNC: 28 MG/DL (ref 7–20)
CALCIUM SERPL-MCNC: 9.8 MG/DL (ref 8.3–10.6)
CHLORIDE BLD-SCNC: 101 MMOL/L (ref 99–110)
CO2: 28 MMOL/L (ref 21–32)
CREAT SERPL-MCNC: 1.4 MG/DL (ref 0.9–1.3)
GFR AFRICAN AMERICAN: >60
GFR NON-AFRICAN AMERICAN: 58
GLUCOSE BLD-MCNC: 111 MG/DL (ref 70–99)
POTASSIUM SERPL-SCNC: 3.8 MMOL/L (ref 3.5–5.1)
SODIUM BLD-SCNC: 141 MMOL/L (ref 136–145)

## 2021-04-06 PROCEDURE — 99213 OFFICE O/P EST LOW 20 MIN: CPT | Performed by: NURSE PRACTITIONER

## 2021-04-06 ASSESSMENT — PATIENT HEALTH QUESTIONNAIRE - PHQ9
SUM OF ALL RESPONSES TO PHQ QUESTIONS 1-9: 0
SUM OF ALL RESPONSES TO PHQ QUESTIONS 1-9: 0

## 2021-04-06 ASSESSMENT — ENCOUNTER SYMPTOMS
COUGH: 0
SHORTNESS OF BREATH: 0
ABDOMINAL PAIN: 0
TROUBLE SWALLOWING: 0

## 2021-04-07 DIAGNOSIS — I10 ESSENTIAL HYPERTENSION: ICD-10-CM

## 2021-04-07 DIAGNOSIS — N28.9 DECREASED RENAL FUNCTION: Primary | ICD-10-CM

## 2021-04-09 DIAGNOSIS — I10 ESSENTIAL HYPERTENSION: ICD-10-CM

## 2021-04-09 DIAGNOSIS — N28.9 DECREASED RENAL FUNCTION: ICD-10-CM

## 2021-04-09 LAB
ANION GAP SERPL CALCULATED.3IONS-SCNC: 12 MMOL/L (ref 3–16)
BUN BLDV-MCNC: 21 MG/DL (ref 7–20)
CALCIUM SERPL-MCNC: 9.6 MG/DL (ref 8.3–10.6)
CHLORIDE BLD-SCNC: 96 MMOL/L (ref 99–110)
CO2: 29 MMOL/L (ref 21–32)
CREAT SERPL-MCNC: 1.5 MG/DL (ref 0.9–1.3)
GFR AFRICAN AMERICAN: >60
GFR NON-AFRICAN AMERICAN: 53
GLUCOSE BLD-MCNC: 88 MG/DL (ref 70–99)
POTASSIUM SERPL-SCNC: 4.1 MMOL/L (ref 3.5–5.1)
SODIUM BLD-SCNC: 137 MMOL/L (ref 136–145)

## 2021-04-13 DIAGNOSIS — I10 ESSENTIAL HYPERTENSION: Primary | ICD-10-CM

## 2021-04-13 DIAGNOSIS — N28.9 DECREASED RENAL FUNCTION: ICD-10-CM

## 2021-04-13 RX ORDER — METOPROLOL SUCCINATE 50 MG/1
50 TABLET, EXTENDED RELEASE ORAL DAILY
Qty: 30 TABLET | Refills: 0 | Status: SHIPPED | OUTPATIENT
Start: 2021-04-13 | End: 2021-05-12

## 2021-04-13 RX ORDER — HYDROCHLOROTHIAZIDE 25 MG/1
25 TABLET ORAL EVERY MORNING
Qty: 30 TABLET | Refills: 0 | Status: SHIPPED | OUTPATIENT
Start: 2021-04-13 | End: 2021-06-16

## 2021-04-16 ENCOUNTER — OFFICE VISIT (OUTPATIENT)
Dept: FAMILY MEDICINE CLINIC | Age: 36
End: 2021-04-16
Payer: COMMERCIAL

## 2021-04-16 VITALS
HEIGHT: 69 IN | RESPIRATION RATE: 14 BRPM | HEART RATE: 82 BPM | WEIGHT: 235.4 LBS | BODY MASS INDEX: 34.87 KG/M2 | OXYGEN SATURATION: 99 % | SYSTOLIC BLOOD PRESSURE: 120 MMHG | DIASTOLIC BLOOD PRESSURE: 70 MMHG

## 2021-04-16 DIAGNOSIS — F41.9 ANXIETY: ICD-10-CM

## 2021-04-16 DIAGNOSIS — N28.9 DECREASED RENAL FUNCTION: ICD-10-CM

## 2021-04-16 DIAGNOSIS — I10 ESSENTIAL HYPERTENSION: Primary | ICD-10-CM

## 2021-04-16 PROCEDURE — 99214 OFFICE O/P EST MOD 30 MIN: CPT | Performed by: NURSE PRACTITIONER

## 2021-04-16 ASSESSMENT — ENCOUNTER SYMPTOMS
VOMITING: 0
NAUSEA: 0
COUGH: 0
SHORTNESS OF BREATH: 0
DIARRHEA: 0

## 2021-04-16 NOTE — PROGRESS NOTES
4/16/2021    This is a 29 y.o. male   Chief Complaint   Patient presents with    Hypertension   . HPI    Hypertension:  Home blood pressure monitoring: Yes - 130/90. He is adherent to a low sodium diet. Patient denies chest pain, shortness of breath, lightheadedness, peripheral edema, palpitations and dry cough. Antihypertensive medication side effects: no medication side effects noted. Has not been taking NSAIDs. Has not been drinking caffeine or alcohol. Sodium (mmol/L)   Date Value   04/09/2021 137    BUN (mg/dL)   Date Value   04/09/2021 21 (H)    Glucose (mg/dL)   Date Value   04/09/2021 88      Potassium (mmol/L)   Date Value   04/09/2021 4.1     Potassium reflex Magnesium (mmol/L)   Date Value   10/05/2019 3.1 (L)    CREATININE (mg/dL)   Date Value   04/09/2021 1.5 (H)         Had decreased renal function on last lab. He hydrated with water and did not have improvement. Now on HCTZ 25 mg daily, metoprolol succinate 50 mg daily, and amlodipine 10 mg daily. Now longer taking the triamterene. No longer on ARB due to history of angioedema on 3/18/21. He reports that he is having a lot of anxiety about his health and is worried and not sleeping well at night. Reports that his father has CKD. He denies depression which he has had a history of in the past.   He reports that his anxiety is about his health. He was supposed to return to work tomorrow by was told by his manger to take another week off work. Patient Active Problem List   Diagnosis    Allergic rhinitis- dust, pollen animals.    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 30 tablet 0    metoprolol succinate (TOPROL XL) 50 MG extended release tablet Take 1 tablet by mouth daily For high blood pressure 30 tablet 0    diclofenac (VOLTAREN) 50 MG EC tablet Take 1 tablet by mouth 2 times daily (with meals) 60 tablet 3    albuterol (PROVENTIL) (2.5 MG/3ML) 0.083% nebulizer solution USE 1 VIAL IN NEBULIZER EVERY 6 HOURS AS NEEDED FOR WHEEZING AND FOR SHORTNESS OF BREATH 900 mL 0    fluticasone-salmeterol (ADVAIR DISKUS) 250-50 MCG/DOSE AEPB Inhale 1 puff into the lungs every 12 hours 60 each 3    budesonide-formoterol (SYMBICORT) 160-4.5 MCG/ACT AERO Inhale 2 puffs into the lungs 2 times daily 1 Inhaler 5    albuterol sulfate HFA (VENTOLIN HFA) 108 (90 Base) MCG/ACT inhaler INHALE TWO PUFFS EVERY 6 HOURS AS NEEDED 18 g 1    amLODIPine (NORVASC) 10 MG tablet Take 1 tablet by mouth once daily 30 tablet 5    Nebulizers (AIRIAL COMPACT MINI NEBULIZER) MISC Use prn for asthma 1 each 0    Respiratory Therapy Supplies (NEBULIZER/TUBING/MOUTHPIECE) KIT 1 kit by Does not apply route daily as needed 1 kit 0     No current facility-administered medications for this visit. Allergies   Allergen Reactions    Losartan Swelling     Angioedema       Review of Systems   Constitutional: Negative for activity change and fever. Respiratory: Negative for cough and shortness of breath. Cardiovascular: Negative for chest pain. Gastrointestinal: Negative for diarrhea, nausea and vomiting. Neurological: Positive for headaches. Mild off and on headache that he attributes to sinus pressure from allergies.         Vitals:    04/16/21 1501 04/16/21 1532   BP: 126/86 120/70   Site:  Right Upper Arm   Position:  Sitting   Cuff Size:  Medium Adult   Pulse: 82    Resp: 14    SpO2: 99%    Weight: 235 lb 6.4 oz (106.8 kg)    Height: 5' 9\" (1.753 m)        Body mass index is 34.76 kg/m². Wt Readings from Last 3 Encounters:   04/16/21 235 lb 6.4 oz (106.8 kg)   04/06/21 232 lb 3.2 oz (105.3 kg)   03/22/21 255 lb (115.7 kg)       BP Readings from Last 3 Encounters:   04/16/21 126/86   04/06/21 (!) 140/100   03/03/21 (!) 146/92       Physical Exam  Vitals signs and nursing note reviewed. Constitutional:       General: He is not in acute distress. Appearance: He is well-developed. He is obese. HENT:      Head: Normocephalic and atraumatic. Neck:      Musculoskeletal: Neck supple. Cardiovascular:      Rate and Rhythm: Normal rate and regular rhythm. Heart sounds: Normal heart sounds. No murmur. No friction rub. No gallop. Pulmonary:      Effort: Pulmonary effort is normal. No respiratory distress. Breath sounds: Normal breath sounds. Musculoskeletal:      Right lower leg: No edema. Left lower leg: No edema. Skin:     General: Skin is warm and dry. Neurological:      Mental Status: He is alert and oriented to person, place, and time. Psychiatric:         Behavior: Behavior normal.         Thought Content: Thought content normal.         Judgment: Judgment normal.         Assessmentand Plan  Ricci Malone was seen today for hypertension. Diagnoses and all orders for this visit:    Essential hypertension  BP much better controlled. On HCTZ 25 mg daily, amlodipine 10 mg daily, and metoprolol succinate 50 mg daily. Continue to work on low salt diet. Continue to avoid NSAIDs  Continue to avoid alcohol. Decreased renal function  Noted on lab 4/6 and 4/9. Had normal renal function 8/24/2020. He is no longer on ARB due to angioedema. He has been hydrating with water. Should repeat BMP. If renal function has not normalized, will refer him to nephrologist for evaluation. Anxiety  Patient is having increased anxiety r/t his health. Discussed that BP is now controlled and monitoring renal function. His renal function would not cause him to be off work. Seems that his anxiety is now the limiting factor. He reports that his manager told him to take another week off work. Planning on returning on 4/24/21. He reports that he will need to return in a week so form can be signed that he can return to work. He does have history of depression, but denies that it is currently active. Previously was having anxiety about his daughter which he reports is now resolved. Advised to start counseling and he reports that he will consider. Return in about 1 week (around 4/23/2021), or if symptoms worsen or fail to improve, for blood pressure, kidney function .

## 2021-04-16 NOTE — PATIENT INSTRUCTIONS
Bulmaro Muller, [address]  [address] [facility]  Phone [phone]    [facility]- multiple locations  Phone [phone]- multiple locations  Phone [phone]     Fax  [address], [address] [facility]  Phone [phone]   Fax Alexis [address]  [address] [facility],[address]   [place], [address]  Phone [phone]

## 2021-04-19 DIAGNOSIS — N28.9 DECREASED RENAL FUNCTION: ICD-10-CM

## 2021-04-19 DIAGNOSIS — I10 ESSENTIAL HYPERTENSION: ICD-10-CM

## 2021-04-20 LAB
ANION GAP SERPL CALCULATED.3IONS-SCNC: 13 MMOL/L (ref 3–16)
BUN BLDV-MCNC: 20 MG/DL (ref 7–20)
CALCIUM SERPL-MCNC: 9.7 MG/DL (ref 8.3–10.6)
CHLORIDE BLD-SCNC: 101 MMOL/L (ref 99–110)
CO2: 31 MMOL/L (ref 21–32)
CREAT SERPL-MCNC: 1.3 MG/DL (ref 0.9–1.3)
GFR AFRICAN AMERICAN: >60
GFR NON-AFRICAN AMERICAN: >60
GLUCOSE BLD-MCNC: 91 MG/DL (ref 70–99)
POTASSIUM SERPL-SCNC: 3.6 MMOL/L (ref 3.5–5.1)
SODIUM BLD-SCNC: 145 MMOL/L (ref 136–145)

## 2021-04-21 ENCOUNTER — TELEPHONE (OUTPATIENT)
Dept: FAMILY MEDICINE CLINIC | Age: 36
End: 2021-04-21

## 2021-04-21 NOTE — TELEPHONE ENCOUNTER
Called her back. Told her he has been seeing Rosanne Mcknight and she did his disability papers but I would try to help. She just wanted to know what his BP reading from 4/16/21 was. I told her what it was. She said thanks, that is all she needed.

## 2021-04-23 ENCOUNTER — OFFICE VISIT (OUTPATIENT)
Dept: FAMILY MEDICINE CLINIC | Age: 36
End: 2021-04-23
Payer: COMMERCIAL

## 2021-04-23 VITALS
HEART RATE: 69 BPM | DIASTOLIC BLOOD PRESSURE: 80 MMHG | BODY MASS INDEX: 35.66 KG/M2 | HEIGHT: 69 IN | OXYGEN SATURATION: 98 % | SYSTOLIC BLOOD PRESSURE: 124 MMHG | WEIGHT: 240.8 LBS | RESPIRATION RATE: 14 BRPM

## 2021-04-23 DIAGNOSIS — I10 ESSENTIAL HYPERTENSION: Primary | ICD-10-CM

## 2021-04-23 PROCEDURE — 99212 OFFICE O/P EST SF 10 MIN: CPT | Performed by: NURSE PRACTITIONER

## 2021-04-23 ASSESSMENT — ENCOUNTER SYMPTOMS
NAUSEA: 0
CONSTIPATION: 0
SHORTNESS OF BREATH: 0
COUGH: 0
VOMITING: 0
ABDOMINAL PAIN: 0
DIARRHEA: 0

## 2021-04-23 NOTE — PROGRESS NOTES
4/23/2021    This is a 29 y.o. male   Chief Complaint   Patient presents with    Hypertension     1 week fu   . HPI    Hypertension:  Home blood pressure monitoring: Yes - 120/80s. He is adherent to a low sodium diet. Patient denies chest pain, shortness of breath, headache, lightheadedness, peripheral edema, palpitations and dry cough. Antihypertensive medication side effects: no medication side effects noted. Has not been taking NSAIDs. Has not been drinking caffeine or alcohol. Sodium (mmol/L)   Date Value   04/19/2021 145    BUN (mg/dL)   Date Value   04/19/2021 20    Glucose (mg/dL)   Date Value   04/19/2021 91      Potassium (mmol/L)   Date Value   04/19/2021 3.6     Potassium reflex Magnesium (mmol/L)   Date Value   10/05/2019 3.1 (L)    CREATININE (mg/dL)   Date Value   04/19/2021 1.3         Patient reports that he is sleeping better and having less anxiety. Repeat renal function from earlier this week shows that kidney function is returning to his baseline normal since he stopped the triamterene. Patient Active Problem List   Diagnosis    Allergic rhinitis- dust, pollen animals. 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  30 tablet 0    metoprolol succinate (TOPROL XL) 50 MG extended release tablet Take 1 tablet by mouth daily For high blood pressure 30 tablet 0    diclofenac (VOLTAREN) 50 MG EC tablet Take 1 tablet by mouth 2 times daily (with meals) 60 tablet 3    albuterol (PROVENTIL) (2.5 MG/3ML) 0.083% nebulizer solution USE 1 VIAL IN NEBULIZER EVERY 6 HOURS AS NEEDED FOR WHEEZING AND FOR SHORTNESS OF BREATH 900 mL 0    fluticasone-salmeterol (ADVAIR DISKUS) 250-50 MCG/DOSE AEPB Inhale 1 puff into the lungs every 12 hours 60 each 3    budesonide-formoterol (SYMBICORT) 160-4.5 MCG/ACT AERO Inhale 2 puffs into the lungs 2 times daily 1 Inhaler 5    albuterol sulfate HFA (VENTOLIN HFA) 108 (90 Base) MCG/ACT inhaler INHALE TWO PUFFS EVERY 6 HOURS AS NEEDED 18 g 1    amLODIPine (NORVASC) 10 MG tablet Take 1 tablet by mouth once daily 30 tablet 5    Nebulizers ([place] COMPACT MINI NEBULIZER) MISC Use prn for asthma 1 each 0    Respiratory Therapy Supplies (NEBULIZER/TUBING/MOUTHPIECE) KIT 1 kit by Does not apply route daily as needed 1 kit 0     No current facility-administered medications for this visit. Allergies   Allergen Reactions    Losartan Swelling     Angioedema       Review of Systems   Constitutional: Negative for activity change, fatigue and fever. Respiratory: Negative for cough and shortness of breath. Cardiovascular: Negative for chest pain. Gastrointestinal: Negative for abdominal pain, constipation, diarrhea, nausea and vomiting. Neurological: Positive for headaches. Mild sinus headache at times        Vitals:    04/23/21 1507   BP: 124/80   Pulse: 69   Resp: 14   SpO2: 98%   Weight: 240 lb 12.8 oz (109.2 kg)   Height: 5' 9\" (1.753 m)       Body mass index is 35.56 kg/m². Wt Readings from Last 3 Encounters:   04/23/21 240 lb 12.8 oz (109.2 kg)   04/16/21 235 lb 6.4 oz (106.8 kg)   04/06/21 232 lb 3.2 oz (105.3 kg)       BP Readings from Last 3 Encounters:   04/23/21 124/80   04/16/21 120/70   04/06/21 (!) 140/100       Physical Exam  Vitals signs and nursing note reviewed. Constitutional:       General: He is not in acute distress. Appearance: He is well-developed. HENT:      Head: Normocephalic and atraumatic. Neck:      Musculoskeletal: Neck supple.    Cardiovascular: Rate and Rhythm: Normal rate and regular rhythm. Heart sounds: Normal heart sounds. No murmur. No friction rub. No gallop. Pulmonary:      Effort: Pulmonary effort is normal. No respiratory distress. Breath sounds: Normal breath sounds. Musculoskeletal:      Right lower leg: No edema. Left lower leg: No edema. Skin:     General: Skin is warm and dry. Neurological:      Mental Status: He is alert and oriented to person, place, and time. Psychiatric:         Behavior: Behavior normal.         Thought Content: Thought content normal.         Judgment: Judgment normal.         Assessmentand Plan  Francesca Wong was seen today for hypertension. Diagnoses and all orders for this visit:    Essential hypertension  Controlled. Continue current medications- HCTZ 25 mg daily, metoprolol succinate 50 mg daily, and amlodipine 10 mg daily. Patient had decreased renal function while on triamterene. Renal function had improved and returning to baseline off the triamterene. Advised to continue to stay hydrated with water. Able to return to work tomorrow without restrictions. Return in about 3 months (around 7/23/2021), or if symptoms worsen or fail to improve, for chronic conditions, with Dr. Bhavesh Guzman.

## 2021-05-12 DIAGNOSIS — I10 ESSENTIAL HYPERTENSION: ICD-10-CM

## 2021-05-12 RX ORDER — METOPROLOL SUCCINATE 50 MG/1
TABLET, EXTENDED RELEASE ORAL
Qty: 90 TABLET | Refills: 0 | Status: SHIPPED | OUTPATIENT
Start: 2021-05-12 | End: 2021-08-13

## 2021-08-12 DIAGNOSIS — I10 ESSENTIAL HYPERTENSION: ICD-10-CM

## 2021-08-13 RX ORDER — HYDROCHLOROTHIAZIDE 25 MG/1
TABLET ORAL
Qty: 30 TABLET | Refills: 0 | Status: SHIPPED | OUTPATIENT
Start: 2021-08-13 | End: 2021-09-28

## 2021-08-16 ENCOUNTER — HOSPITAL ENCOUNTER (EMERGENCY)
Age: 36
Discharge: HOME | End: 2021-08-16
Attending: EMERGENCY MEDICINE
Payer: COMMERCIAL

## 2021-08-16 ENCOUNTER — OFFICE VISIT (OUTPATIENT)
Dept: FAMILY MEDICINE CLINIC | Age: 36
End: 2021-08-16
Payer: COMMERCIAL

## 2021-08-16 VITALS
DIASTOLIC BLOOD PRESSURE: 81 MMHG | BODY MASS INDEX: 37.94 KG/M2 | HEIGHT: 69 IN | OXYGEN SATURATION: 98 % | SYSTOLIC BLOOD PRESSURE: 141 MMHG | TEMPERATURE: 98.5 F | HEART RATE: 75 BPM | WEIGHT: 256.17 LBS

## 2021-08-16 VITALS
HEIGHT: 69 IN | HEART RATE: 66 BPM | DIASTOLIC BLOOD PRESSURE: 94 MMHG | BODY MASS INDEX: 37.18 KG/M2 | WEIGHT: 251 LBS | SYSTOLIC BLOOD PRESSURE: 128 MMHG | OXYGEN SATURATION: 97 %

## 2021-08-16 DIAGNOSIS — I10 ESSENTIAL HYPERTENSION: Primary | ICD-10-CM

## 2021-08-16 DIAGNOSIS — F41.9 ANXIETY DISORDER, UNSPECIFIED TYPE: ICD-10-CM

## 2021-08-16 DIAGNOSIS — I10 HYPERTENSION, UNSPECIFIED TYPE: Primary | ICD-10-CM

## 2021-08-16 PROCEDURE — 99284 EMERGENCY DEPT VISIT MOD MDM: CPT

## 2021-08-16 PROCEDURE — 6370000000 HC RX 637 (ALT 250 FOR IP): Performed by: EMERGENCY MEDICINE

## 2021-08-16 PROCEDURE — 99214 OFFICE O/P EST MOD 30 MIN: CPT | Performed by: FAMILY MEDICINE

## 2021-08-16 RX ORDER — HYDROXYZINE PAMOATE 25 MG/1
50 CAPSULE ORAL ONCE
Status: COMPLETED | OUTPATIENT
Start: 2021-08-16 | End: 2021-08-16

## 2021-08-16 RX ORDER — TRIAMTERENE AND HYDROCHLOROTHIAZIDE 37.5; 25 MG/1; MG/1
1 TABLET ORAL DAILY
Qty: 30 TABLET | Refills: 3 | Status: SHIPPED | OUTPATIENT
Start: 2021-08-16 | End: 2021-09-28 | Stop reason: SDUPTHER

## 2021-08-16 RX ADMIN — HYDROXYZINE PAMOATE 50 MG: 25 CAPSULE ORAL at 03:42

## 2021-08-16 ASSESSMENT — ENCOUNTER SYMPTOMS
BACK PAIN: 0
WHEEZING: 0
PHOTOPHOBIA: 0
NAUSEA: 0
SHORTNESS OF BREATH: 0
COLOR CHANGE: 0
RHINORRHEA: 0
VOMITING: 0

## 2021-08-16 NOTE — ED PROVIDER NOTES
[facility]  [facility]      Pt Name: Jeanette Khoury  MRN: [id number]  [place] 1985  Date ofevaluation: 8/16/2021  Provider: Oleg Jorge MD    CHIEF COMPLAINT       Chief Complaint   Patient presents with    Hypertension         HISTORY OF PRESENT ILLNESS   (Location/Symptom, Timing/Onset,Context/Setting, Quality, Duration, Modifying Factors, Severity)  Note limiting factors. Jeanette Khoury is a 29 y.o. male  who  has a past medical history of Asthma, Hemorrhagic stroke ([address] and Hypertension. who presents to the emergency department valuation of anxiety high blood pressure. Patient reports that his wife was a centimeter that agitated him. and that his head felt strange and he checked his blood pressure is 877 systolic . He states that he felt anxious. He states he has been taking his prescribed occasions denies chest pain shortness of breath paresthesias weakness headache or changes in vision. . Denies nausea or vomiting. On arrival to the ED the patient states he does feel improved. HPI    NursingNotes were reviewed. REVIEW OF SYSTEMS    (2-9 systems for level 4, 10 or more for level 5)     Review of Systems   Constitutional: Negative for activity change, chills and fever. HENT: Negative for congestion and rhinorrhea. Eyes: Negative for photophobia and visual disturbance. Respiratory: Negative for shortness of breath and wheezing. Cardiovascular: Negative for palpitations and leg swelling. Gastrointestinal: Negative for nausea and vomiting. Endocrine: Negative for polydipsia and polyuria. Genitourinary: Negative for difficulty urinating and frequency. Musculoskeletal: Negative for back pain and gait problem. Skin: Negative for color change and rash. Neurological: Negative for light-headedness and headaches. Psychiatric/Behavioral: Negative for confusion. The patient is not nervous/anxious.     All other systems reviewed and are negative. Except as noted above the remainder of the review of systems was reviewed and negative.        PAST MEDICAL HISTORY     Past Medical History:   Diagnosis Date    Asthma     Hemorrhagic stroke ([address] 10/31/13    due to berry aneurysm, [facility]    Hypertension          SURGICALHISTORY       Past Surgical History:   Procedure Laterality Date    BRAIN ANEURYSM SURGERY  11/1/13    [facility]; Dr Bradshaw    CSF SHUNT  11/1/13    ORCHIOPEXY Bilateral 10/5/2019    SCROTAL EXPLORATION, BILATERAL ORCHIOPEXY performed by Issac Saul MD at [address] [place] [address]       Discharge Medication List as of 8/16/2021  3:48 AM      CONTINUE these medications which have NOT CHANGED    Details   hydroCHLOROthiazide (HYDRODIURIL) 25 MG tablet TAKE 1 TABLET BY MOUTH IN THE MORNING FOR HIGH BLOOD PRESSURE, Disp-30 tablet, R-0Normal      metoprolol succinate (TOPROL XL) 50 MG extended release tablet TAKE 1 TABLET BY MOUTH ONCE DAILY FOR HIGH BLOOD PRESSURE, Disp-30 tablet, R-0Normal      fluticasone-salmeterol (ADVAIR) 250-50 MCG/DOSE AEPB INHALE 1 PUFF BY MOUTH EVERY 12 HOURS, Disp-60 each, R-2Normal      amLODIPine (NORVASC) 10 MG tablet Take 1 tablet by mouth once daily, Disp-30 tablet, R-3Normal      diclofenac (VOLTAREN) 50 MG EC tablet Take 1 tablet by mouth 2 times daily (with meals), Disp-60 tablet, R-3Normal      albuterol (PROVENTIL) (2.5 MG/3ML) 0.083% nebulizer solution USE 1 VIAL IN NEBULIZER EVERY 6 HOURS AS NEEDED FOR WHEEZING AND FOR SHORTNESS OF BREATH, Disp-900 mL, R-0Normal      budesonide-formoterol (SYMBICORT) 160-4.5 MCG/ACT AERO Inhale 2 puffs into the lungs 2 times daily, Disp-1 Inhaler, R-5Normal      albuterol sulfate HFA (VENTOLIN HFA) 108 (90 Base) MCG/ACT inhaler INHALE TWO PUFFS EVERY 6 HOURS AS NEEDED, Disp-18 g, R-1Normal      Nebulizers (AIRIAL COMPACT MINI NEBULIZER) MISC Disp-1 each, R-0, NormalUse prn for asthma      Respiratory Therapy Supplies (NEBULIZER/TUBING/MOUTHPIECE) KIT DAILY PRN Starting 1/18/2016, Until Discontinued, Disp-1 kit, R-0, Print                  Losartan    FAMILY HISTORY       Family History   Problem Relation Age of Onset    Asthma Mother     Substance Abuse Father           SOCIAL HISTORY       Social History     Socioeconomic History    Marital status:      Spouse name: wife Richard Washburn Number of children: 3    Years of education: Not on file    Highest education level: Not on file   Occupational History    Occupation: [employer]   Tobacco Use    Smoking status: Never Smoker    Smokeless tobacco: Never Used   Vaping Use    Vaping Use: Never used   Substance and Sexual Activity    Alcohol use: Yes     Comment: hx alcoholism. drinks occasionally now, but only 1/day.  Drug use: Yes     Types: Marijuana    Sexual activity: Yes     Partners: Female     Comment: Wife Enjale   Other Topics Concern    Not on file   Social History Narrative    Not on file     Social Determinants of Health     Financial Resource Strain:     Difficulty of Paying Living Expenses:    Food Insecurity:     Worried About Running Out of Food in the Last Year:     [address] in the Last Year:    Transportation Needs:     Lack of Transportation (Medical):      Lack of Transportation (Non-Medical):    Physical Activity:     Days of Exercise per Week:     Minutes of Exercise per Session:    Stress:     Feeling of Stress :    Social Connections:     Frequency of Communication with Friends and Family:     Frequency of Social Gatherings with Friends and Family:     Attends Hinduism Services:     Active Member of Clubs or Organizations:     Attends Club or Organization Meetings:     Marital Status:    Intimate Partner Violence:     Fear of Current or Ex-Partner:     Emotionally Abused:     Physically Abused:     Sexually Abused:        SCREENINGS             PHYSICAL EXAM    (up to 7 for level 4, 8 or more for level 5) ED Triage Vitals [08/16/21 0311]   BP Temp Temp Source Pulse Resp SpO2 Height Weight   (!) 174/106 98.5 °F (36.9 °C) Oral 75 -- 100 % 5' 9\" (1.753 m) 256 lb 2.8 oz (116.2 kg)       Physical Exam  Constitutional:       General: He is not in acute distress. Appearance: He is well-developed. HENT:      Head: Normocephalic and atraumatic. Eyes:      Extraocular Movements: Extraocular movements intact. Conjunctiva/sclera: Conjunctivae normal.      Pupils: Pupils are equal, round, and reactive to light. Neck:      Trachea: No tracheal deviation. Cardiovascular:      Rate and Rhythm: Normal rate and regular rhythm. Pulmonary:      Effort: Pulmonary effort is normal.      Breath sounds: Normal breath sounds. No wheezing or rales. Abdominal:      General: There is no distension. Palpations: Abdomen is soft. Tenderness: There is no abdominal tenderness. Musculoskeletal:         General: No deformity. Normal range of motion. Cervical back: Normal range of motion. Skin:     General: Skin is warm and dry. Neurological:      Mental Status: He is alert and oriented to person, place, and time. RESULTS     EKG: All EKG's are interpreted by the Emergency Department Physician who either signs or Co-signsthis chart in the absence of a cardiologist.      RADIOLOGY:   Dennis Smoker such as CT, Ultrasound and MRI are read by the radiologist. Plain radiographic images are visualized and preliminarily interpreted by the emergency physician with the below findings:        Interpretation per the Radiologist below, if available at the time ofthis note:    No orders to display         ED BEDSIDE ULTRASOUND:   Performed by ED Physician - none    LABS:  Labs Reviewed - No data to display    All other labs were within normal range or not returned as of this dictation.     EMERGENCY DEPARTMENT COURSE and DIFFERENTIAL DIAGNOSIS/MDM:   Vitals:    Vitals:    08/16/21 0311 08/16/21 0331   BP: (!) 174/106 (!) 141/81   Pulse: 75    Temp: 98.5 °F (36.9 °C)    TempSrc: Oral    SpO2: 100% 98%   Weight: 256 lb 2.8 oz (116.2 kg)    Height: 5' 9\" (1.753 m)        Patient was given thefollowing medications:  Medications   hydrOXYzine (VISTARIL) capsule 50 mg (50 mg Oral Given 8/16/21 0342)       ED COURSE & MEDICAL DECISION MAKING    Pertinent Labs & Imaging studies reviewed. (See chart for details)   -  Patient seen and evaluated in the emergency department. -  Triage and nursing notes reviewed and incorporated. -  Old chart records reviewed and incorporated. -  Differential diagnosis includes: Differential diagnosis: Asymptomatic hypertension, hypertensive urgency, hypertensive emergency, hypertension encephalopathy, acute coronary syndrome, acute renal failure, Thrombotic Stroke, Embolic Stroke, Hemorrhagic Stroke, pain or vertigo or other medical problems elevating the blood pressure secondarily which is a normal physiologic response, other    -  Work-up included:  See above  -  ED treatment included: See above  -  Results discussed with patient. Patient presents the ED for evaluation of what is essentially asymptomatic hypertension. Blood pressure improved without intervention. He was amenable to trying an anxiolytic. Patient feels improved on reevaluation. Symptomatic treatment with expectant management discussed with the patient and they and/or family members present are amenable to treatment plan and outpatient follow-up. Strict return precautions were discussed with the patient and those present. They demonstrated understanding of when to return to the emergency department for new or worsening symptoms. .  The patient is agreeable with plan of care and disposition. REASSESSMENT          CRITICAL CARE TIME   Total Critical Care time was 0 minutes, excluding separately reportable procedures.   There was a high probability of clinically significant/life threatening deterioration in the patient's condition which required my urgent intervention. CONSULTS:  None    PROCEDURES:  Unless otherwise noted below, none     Procedures    FINAL IMPRESSION      1.  Hypertension, unspecified type          DISPOSITION/PLAN   DISPOSITION Decision To Discharge 08/16/2021 03:41:06 AM      PATIENT REFERREDTO:  Randalyn Gilford, MD  [facility] [address] [address]  [phone]    Schedule an appointment as soon as possible for a visit   As needed      DISCHARGEMEDICATIONS:  Discharge Medication List as of 8/16/2021  3:48 AM             (Please note that portions of this note were completed with a voice recognition program.  Efforts were made to edit the dictations but occasionally words are mis-transcribed.)    Eun Hendricks MD (electronically signed)  Attending Emergency Physician          Eun Hendricks MD  08/16/21 3069

## 2021-08-16 NOTE — LETTER
[facility] Emergency Department  [address] [facility] [address]  Phone: [phone]               August 16, 2021    Patient: Consuelo Haynes   YOB: 1985   Date of Visit: 8/16/2021       To Whom It May Concern:    Consuelo Haynes was seen and treated in our emergency department on 8/16/2021. He may return to work on 08/17/2021.       Sincerely,               Signature:__________________________________

## 2021-08-16 NOTE — ED NOTES
Pt states he woke up 1 hr PTA feeling anxious and took his BP and it was 170/100. States he thinks he was anxious because of a type of music his wife was playing. Pt does not see anyone for his anxiety but does take BP medication. Pt denies SOB, CP, dizziness.      Yolis Otero RN  08/16/21 4721

## 2021-08-16 NOTE — PROGRESS NOTES
2021    Blood pressure (!) 130/96, pulse 66, height 5' 9\" (1.753 m), weight 251 lb (113.9 kg), SpO2 97 %. Stephen Palmer (:  1985) is a 29 y.o. male, here for evaluation of the following medical concerns:    Chief Complaint   Patient presents with    Follow-up     2 mo f/u htn     Onset HTN in his 19's. While he was drinking heavily and was overweight. Hx depression. He has been struggling to get up and go to work- very stressed. He went to the ER last night with anxiety attack. bp was high. bp decreased with time. To 141/80  He monitors bp at home: it stressed him out to do this. But is normally 150/90 at home. Lower at the [address] [place] [address] office typically. Denies seasonal variation to mood. He is feeling a fair mix of feeling 'blue' and worry symptoms. No SI. Has hx of a brain aneurysm when BP was very high- this worries him. Feels he has a little 'ptsd' from his brain aneurysm. Has not been through counseling. Prior meds for depression: sertraline  does not recall the effect. Not drinking alcohol regularly now. No tobacco use    currently on toprol 50, norvasc 10, hctz 25. Had allergic reaction to losartan. He has only used voltaren once in past 6 months. He is going to the gym to work out, likes [employer]. ER doctor told him not to do HEAVY lifting until his bp is lower. Denies neurologic sx like dizzy, LH, HA, weakness, numbness. No chest pains, SOB. Last renal function test: stable  Lab Results   Component Value Date     2021    K 3.6 2021    K 3.1 10/05/2019    BUN 20 2021    CREATININE 1.3 2021     Estimated Creatinine Clearance: 99 mL/min (based on SCr of 1.3 mg/dL). Patient Active Problem List   Diagnosis    Allergic rhinitis- dust, pollen animals.     Asthma    HTN (hypertension)    History of alcohol abuse- quit     History of depression- [facility]     [facility]acranial hemorrhage 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 Wt Readings from Last 3 Encounters:   08/16/21 251 lb (113.9 kg)   08/16/21 256 lb 2.8 oz (116.2 kg)   04/23/21 240 lb 12.8 oz (109.2 kg)       BP Readings from Last 3 Encounters:   08/16/21 (!) 130/96   08/16/21 (!) 141/81   04/23/21 124/80       Allergies   Allergen Reactions    Losartan Swelling     Angioedema       Prior to Visit Medications    Medication Sig Taking?  Authorizing Provider   hydroCHLOROthiazide (HYDRODIURIL) 25 MG tablet TAKE 1 TABLET BY MOUTH IN THE MORNING FOR HIGH BLOOD PRESSURE  Cynthia Renner MD   metoprolol succinate (TOPROL XL) 50 MG extended release tablet TAKE 1 TABLET BY MOUTH ONCE DAILY FOR HIGH BLOOD PRESSURE  Cynthia Renner MD   fluticasone-salmeterol (ADVAIR) 250-50 MCG/DOSE AEPB INHALE 1 PUFF BY MOUTH EVERY 12 HOURS  Cynthia Renner MD   amLODIPine (NORVASC) 10 MG tablet Take 1 tablet by mouth once daily  Cynthia Renner MD   diclofenac (VOLTAREN) 50 MG EC tablet Take 1 tablet by mouth 2 times daily (with meals)  Wallace Squires MD   albuterol (PROVENTIL) (2.5 MG/3ML) 0.083% nebulizer solution USE 1 VIAL IN NEBULIZER EVERY 6 HOURS AS NEEDED FOR WHEEZING AND FOR SHORTNESS OF BREATH  Cynthia Renner MD   albuterol sulfate HFA (VENTOLIN HFA) 108 (90 Base) MCG/ACT inhaler INHALE TWO PUFFS EVERY 6 HOURS AS NEEDED  Cynthia Renner MD   losartan (COZAAR) 100 MG tablet Take 1 tablet by mouth once daily  Cynthia Renner MD   Nebulizers [place] COMPACT MINI NEBULIZER) MISC Use prn for asthma  Cynthia Renner MD   Respiratory Therapy Supplies (NEBULIZER/TUBING/MOUTHPIECE) KIT 1 kit by Does not apply route daily as needed  Cynthia Renner MD        Social History     Tobacco Use    Smoking status: Never Smoker    Smokeless tobacco: Never Used   Vaping Use    Vaping

## 2021-09-10 DIAGNOSIS — I10 ESSENTIAL HYPERTENSION: ICD-10-CM

## 2021-09-10 RX ORDER — METOPROLOL SUCCINATE 50 MG/1
TABLET, EXTENDED RELEASE ORAL
Qty: 30 TABLET | Refills: 0 | Status: SHIPPED | OUTPATIENT
Start: 2021-09-10 | End: 2021-09-28 | Stop reason: SDUPTHER

## 2021-09-10 RX ORDER — AMLODIPINE BESYLATE 10 MG/1
TABLET ORAL
Qty: 30 TABLET | Refills: 0 | Status: SHIPPED | OUTPATIENT
Start: 2021-09-10 | End: 2021-09-28 | Stop reason: SDUPTHER

## 2021-09-28 ENCOUNTER — OFFICE VISIT (OUTPATIENT)
Dept: FAMILY MEDICINE CLINIC | Age: 36
End: 2021-09-28
Payer: COMMERCIAL

## 2021-09-28 VITALS
DIASTOLIC BLOOD PRESSURE: 84 MMHG | SYSTOLIC BLOOD PRESSURE: 124 MMHG | HEIGHT: 69 IN | HEART RATE: 60 BPM | WEIGHT: 248 LBS | OXYGEN SATURATION: 97 % | BODY MASS INDEX: 36.73 KG/M2 | RESPIRATION RATE: 14 BRPM | TEMPERATURE: 98.5 F

## 2021-09-28 DIAGNOSIS — F41.9 ANXIETY DISORDER, UNSPECIFIED TYPE: ICD-10-CM

## 2021-09-28 DIAGNOSIS — Z11.59 ENCOUNTER FOR HEPATITIS C SCREENING TEST FOR LOW RISK PATIENT: ICD-10-CM

## 2021-09-28 DIAGNOSIS — G47.9 SLEEP DISORDER: ICD-10-CM

## 2021-09-28 DIAGNOSIS — Z13.220 SCREENING, LIPID: ICD-10-CM

## 2021-09-28 DIAGNOSIS — I10 ESSENTIAL HYPERTENSION: Primary | ICD-10-CM

## 2021-09-28 PROCEDURE — 99214 OFFICE O/P EST MOD 30 MIN: CPT | Performed by: FAMILY MEDICINE

## 2021-09-28 RX ORDER — AMLODIPINE BESYLATE 10 MG/1
TABLET ORAL
Qty: 90 TABLET | Refills: 1 | Status: SHIPPED | OUTPATIENT
Start: 2021-09-28 | End: 2022-04-14

## 2021-09-28 RX ORDER — TRIAMTERENE AND HYDROCHLOROTHIAZIDE 37.5; 25 MG/1; MG/1
1 TABLET ORAL DAILY
Qty: 90 TABLET | Refills: 1 | Status: SHIPPED | OUTPATIENT
Start: 2021-09-28 | End: 2022-06-02 | Stop reason: DRUGHIGH

## 2021-09-28 RX ORDER — METOPROLOL SUCCINATE 50 MG/1
TABLET, EXTENDED RELEASE ORAL
Qty: 90 TABLET | Refills: 1 | Status: SHIPPED | OUTPATIENT
Start: 2021-09-28 | End: 2022-04-14

## 2021-09-28 NOTE — PATIENT INSTRUCTIONS
Good sleep is inuenced by many factors.     Things that are known to make sleep worse  Napping during the day  Watching television in bed  Using a device with a bright screen in the hour before bedtime (e.g. phone, tablet)  Consuming drinks containing caffeine after 6 pm (includes tea, coffee, cola, energy drinks, hot chocolate)  Drinking alcohol (alcohol typically leads to interrupted sleep)  Eating a heavy meal less than 3 hours before bedtime  Staying in bed even if you cant fall asleep (its better to get up and do something relaxing, then try again later)    Things that are known to improve sleep  Regular exercise  - It is recommended to do at least 3 x 30 minutes per week  - It is best not to exercise in the 2-3 hours before bedtime    Relaxation exercises (e.g. relaxed breathing exercises, progressive muscle relaxation)  Having a relaxing bedtime routine (e.g. taking a bath or a shower, reading a comforting book)    Setting the conditions for sleep  - Make sure the bedroom is completely dark (blackout curtains are cheap and eective)  - Make sure the mattress and pillows are comfortable   - Make sure the bedroom is the right temperature (not too hot, not too cold)    Regular bed time and wake up times, even on the weekends

## 2021-09-28 NOTE — PROGRESS NOTES
2021    Blood pressure 124/84, pulse 60, temperature 98.5 °F (36.9 °C), temperature source Oral, resp. rate 14, height 5' 9\" (1.753 m), weight 248 lb (112.5 kg), SpO2 97 %. Bouchra Kelley (:  1985) is a 29 y.o. male, here for evaluation of the following medical concerns:    Chief Complaint   Patient presents with    Hypertension     no issues or comcerns     Here for routine follow up of HTN. He reports poor sleep. Typically goes to bed by 10, asleep by 12. Takes a long time to 'relax and fall asleep.'  Watches TV or phone stuff. Goes to bed 2 am if he goes to work late. He 'has always been a night owl.'  Does awaken at night to urinate 1-2x. Easy to get back to sleep usually. But occ it is hard to get back to sleep. He feels tired the next day, un-refreshed. Wife tells him he snores. Wife has anxiety and poor sleep also. He does not use caffeine (cut out coffee due to high bp). Alcohol use: rare now. Due to high bp. Still uses zoloft for anxiety. This helped his sleep and suppreses nightly panic attacks. HTN: current regimen: toprol, norvasc, maxzide as below. Does not test bp at home- made him more anxious. Last renal function test: improved potassium on maxzide (from plain hctz)  Lab Results   Component Value Date     2021    K 3.6 2021    K 3.1 10/05/2019    BUN 20 2021    CREATININE 1.3 2021     CrCl cannot be calculated (Patient's most recent lab result is older than the maximum 120 days allowed. ). No side effects to meds. No edema. No hx of CAD, TIA, CVA or PVD. Last lipid test:  Lab Results   Component Value Date    CHOL 192 2020    TRIG 57 2020    HDL 62 (H) 2020    LDLCALC 119 (H) 2020     Lab Results   Component Value Date    ALT 17 2020    AST 23 2020     Weight is less than last year. Was 258 lbs a year ago. Breathing very good with use of advair. Rarely uses albuterol now. Patient Active Problem List   Diagnosis    Allergic rhinitis- dust, pollen animals. 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 Wt Readings from Last 3 Encounters:   09/28/21 248 lb (112.5 kg)   08/16/21 251 lb (113.9 kg)   08/16/21 256 lb 2.8 oz (116.2 kg)       BP Readings from Last 3 Encounters:   09/28/21 124/84   08/16/21 (!) 128/94   08/16/21 (!) 141/81       Allergies   Allergen Reactions    Losartan Swelling     Angioedema       Prior to Visit Medications    Medication Sig Taking?  Authorizing Provider   amLODIPine (NORVASC) 10 MG tablet Take 1 tablet by mouth once daily Yes Yasmine Roblero MD   metoprolol succinate (TOPROL XL) 50 MG extended release tablet TAKE 1 TABLET BY MOUTH ONCE DAILY FOR HIGH BLOOD PRESSURE Yes Yasmine Roblero MD   triamterene-hydroCHLOROthiazide [facility]) 37.5-25 MG per tablet Take 1 tablet by mouth daily Yes Yasmine Roblero MD   sertraline (ZOLOFT) 50 MG tablet Take 1 tablet by mouth daily Yes Yasmine Roblero MD   fluticasone-salmeterol (ADVAIR) 250-50 MCG/DOSE AEPB INHALE 1 PUFF BY MOUTH EVERY 12 HOURS Yes Yasmine Roblero MD   diclofenac (VOLTAREN) 50 MG EC tablet Take 1 tablet by mouth 2 times daily (with meals) Yes Simuel Gosselin, MD   albuterol (PROVENTIL) (2.5 MG/3ML) 0.083% nebulizer solution USE 1 VIAL IN NEBULIZER EVERY 6 HOURS AS NEEDED FOR WHEEZING AND FOR SHORTNESS OF BREATH Yes Yasmine Roblero MD   albuterol sulfate HFA (VENTOLIN HFA) 108 (90 Base) MCG/ACT inhaler INHALE TWO PUFFS EVERY 6 HOURS AS NEEDED Yes Yasmine Roblero MD   Nebulizers (AIRIAL COMPACT MINI NEBULIZER) MISC Use prn for asthma Yes Ericka Hilton Varun Harden MD   Respiratory Therapy Supplies (NEBULIZER/TUBING/MOUTHPIECE) KIT 1 kit by Does not apply route daily as needed Yes Carolyn Randhawa MD   hydroCHLOROthiazide (HYDRODIURIL) 25 MG tablet TAKE 1 TABLET BY MOUTH IN THE MORNING FOR HIGH BLOOD PRESSURE  Patient not taking: Reported on 9/28/2021  Carolyn Randhawa MD   losartan (COZAAR) 100 MG tablet Take 1 tablet by mouth once daily  Carolyn Randhawa MD        Social History     Tobacco Use    Smoking status: Never Smoker    Smokeless tobacco: Never Used   Vaping Use    Vaping Use: Never used   Substance Use Topics    Alcohol use: Yes     Comment: hx alcoholism. drinks occasionally now, but only 1/day.  Drug use: Yes     Types: Marijuana       Review of Systems As above     Physical Exam  Vitals and nursing note reviewed. Constitutional:       General: He is not in acute distress. Appearance: Normal appearance. He is well-developed. He is not diaphoretic. Cardiovascular:      Rate and Rhythm: Normal rate and regular rhythm. Pulses: Normal pulses. Heart sounds: Normal heart sounds. No murmur heard. Pulmonary:      Effort: Pulmonary effort is normal. No respiratory distress. Breath sounds: Normal breath sounds. No wheezing or rales. Musculoskeletal:      Cervical back: Normal range of motion and neck supple. Right lower leg: No edema. Left lower leg: No edema. Lymphadenopathy:      Cervical: No cervical adenopathy. Skin:     General: Skin is warm and dry. Neurological:      General: No focal deficit present. Mental Status: He is alert and oriented to person, place, and time. Mental status is at baseline. Psychiatric:         Mood and Affect: Mood normal.         Behavior: Behavior normal.         Thought Content: Thought content normal.         Judgment: Judgment normal.         ASSESSMENT/PLAN:    1.  Essential hypertension  - - Blood pressure is at goal on current therapy; continue meds and monitoring. Regular physical activity and healthy diet (low sodium, multiple servings of produce daily) was recommended. Renal function to be assessed yearly. - Comprehensive Metabolic Panel; Future    2. Sleep disorder  - discussed sleep hygeine at length. 3. Anxiety disorder, unspecified type  - Stable; continue zolfot     4. Screening, lipid  - Lipid Panel; Future    Recommended covid vaccine. He did have COVID infection in 2020. Return in about 6 months (around 3/28/2022) for follow up HTN. An  [employer] was used to authenticate this note.     --Avelino Ferguson MD on 9/28/2021 at 10:27 AM

## 2022-01-03 ENCOUNTER — PATIENT MESSAGE (OUTPATIENT)
Dept: FAMILY MEDICINE CLINIC | Age: 37
End: 2022-01-03

## 2022-01-03 RX ORDER — NEBULIZER ACCESSORIES
1 KIT MISCELLANEOUS DAILY PRN
Qty: 1 KIT | Refills: 0 | Status: SHIPPED | OUTPATIENT
Start: 2022-01-03 | End: 2022-06-02

## 2022-01-03 NOTE — TELEPHONE ENCOUNTER
From: Omid Petit  To: Dr. Shon Aggarwal: 1/3/2022 12:41 PM EST  Subject: Nebulizer supplies    My asthma feels like it may be starting to act up and I was wondering if I could get a prescription for a new air hose, mouthpiece, and the thing that holds the medicine sent to [address] [place] [address] in [place].

## 2022-02-24 ENCOUNTER — OFFICE VISIT (OUTPATIENT)
Dept: FAMILY MEDICINE CLINIC | Age: 37
End: 2022-02-24
Payer: COMMERCIAL

## 2022-02-24 VITALS
BODY MASS INDEX: 35.99 KG/M2 | DIASTOLIC BLOOD PRESSURE: 80 MMHG | HEIGHT: 69 IN | OXYGEN SATURATION: 99 % | HEART RATE: 67 BPM | SYSTOLIC BLOOD PRESSURE: 110 MMHG | WEIGHT: 243 LBS

## 2022-02-24 DIAGNOSIS — A08.4 VIRAL GASTROENTERITIS: Primary | ICD-10-CM

## 2022-02-24 PROCEDURE — 99213 OFFICE O/P EST LOW 20 MIN: CPT | Performed by: FAMILY MEDICINE

## 2022-02-24 NOTE — PROGRESS NOTES
2022    Blood pressure 110/80, pulse 67, height 5' 9\" (1.753 m), weight 243 lb (110.2 kg), SpO2 99 %. Giancarlo Sun (:  1985) is a 29 y.o. male, here for evaluation of the following medical concerns:    Chief Complaint   Patient presents with    Other     needs papers for [employer] for the past week to be filled out. said he had a stomach virus and was out of work     Here for illness. started a week ago. + nausea,  frequent watery diarreha (up to 6x a day or more at its worst) + abd cramps. Anorexia- food makes worse,  Fatigue. LG fever, not since . No bloody stool  Stools liquid  Drinking ok. His whole family has had this infection. Last episode of diarrhea this AM.  rx used for this: none. He feels much better today than he did yesterday. abd cramping is less. He wants to try and go back to work ASAP. Has been off all week. Perhaps this weekend to return. Patient Active Problem List   Diagnosis    Allergic rhinitis- dust, pollen animals.  Asthma    HTN (hypertension)    History of alcohol abuse- quit     [facility] of [facility]- [facility]     Intracranial hemorrhage [facility]  Dr Bradshaw    Shift work sleep disorder    Keratoconus of left eye- follows with optho    Ganglion cyst of right foot    Anxiety disorder        Body mass index is 35.88 kg/m². Wt Readings from Last 3 Encounters:   22 243 lb (110.2 kg)   21 248 lb (112.5 kg)   21 251 lb (113.9 kg)       BP Readings from Last 3 Encounters:   22 110/80   21 124/84   21 (!) 128/94       Allergies   Allergen Reactions    Losartan Swelling     Angioedema       Prior to Visit Medications    Medication Sig Taking?  Authorizing Provider   albuterol sulfate  (90 Base) MCG/ACT inhaler INHALE 2 PUFFS BY MOUTH EVERY 6 HOURS AS NEEDED Yes Avery Stanton MD   Respiratory Therapy Supplies (NEBULIZER/TUBING/MOUTHPIECE) KIT 1 kit by Does not apply route daily as needed (with nebulizer use) Yes Tiffanie Gardner MD   amLODIPine (NORVASC) 10 MG tablet Take 1 tablet by mouth once daily Yes Tiffanie Gardner MD   metoprolol succinate (TOPROL XL) 50 MG extended release tablet TAKE 1 TABLET BY MOUTH ONCE DAILY FOR HIGH BLOOD PRESSURE Yes Tiffanie Gardner MD   triamterene-hydroCHLOROthiazide [facility]) 37.5-25 MG per tablet Take 1 tablet by mouth daily Yes Tiffanie Gardner MD   sertraline (ZOLOFT) 50 MG tablet Take 1 tablet by mouth daily Yes Tiffanie Gardner MD   fluticasone-salmeterol (ADVAIR) 250-50 MCG/DOSE AEPB INHALE 1 PUFF BY MOUTH EVERY 12 HOURS Yes Tiffanie Gardner MD   diclofenac (VOLTAREN) 50 MG EC tablet Take 1 tablet by mouth 2 times daily (with meals) Yes Dasia Garcia MD   albuterol (PROVENTIL) (2.5 MG/3ML) 0.083% nebulizer solution USE 1 VIAL IN NEBULIZER EVERY 6 HOURS AS NEEDED FOR WHEEZING AND FOR SHORTNESS OF BREATH Yes Tiffanie Gardner MD   Nebulizers (AIRIAL COMPACT MINI NEBULIZER) MISC Use prn for asthma Yes Tiffanie Gardner MD   losartan (COZAAR) 100 MG tablet Take 1 tablet by mouth once daily  Tiffanie Gardner MD        Social History     Tobacco Use    Smoking status: Never Smoker    Smokeless tobacco: Never Used   Vaping Use    Vaping Use: Never used   Substance Use Topics    Alcohol use: Yes     Comment: hx alcoholism. drinks occasionally now, but only 1/day.  Drug use: Yes     Types: Marijuana Eston Melanie)       Review of Systems As above     Physical Exam  Vitals and nursing note reviewed. Constitutional:       General: He is not in acute distress. Appearance: Normal appearance. He is well-developed. He is not diaphoretic. Cardiovascular:      Rate and Rhythm: Normal rate and regular rhythm. Pulses: Normal pulses. Heart sounds: Normal heart sounds. No murmur heard. Pulmonary:      Effort: Pulmonary effort is normal. No respiratory distress.       Breath sounds: Normal breath sounds. No wheezing or rales. Abdominal:      General: Abdomen is flat. Palpations: Abdomen is soft. Comments: Hyperactive bowel sounds. Musculoskeletal:      Cervical back: Normal range of motion. Right lower leg: No edema. Left lower leg: No edema. Skin:     General: Skin is warm and dry. Neurological:      General: No focal deficit present. Mental Status: He is alert and oriented to person, place, and time. Mental status is at baseline. Psychiatric:         Mood and Affect: Mood normal.         Behavior: Behavior normal.         Thought Content: Thought content normal.         Judgment: Judgment normal.         ASSESSMENT/PLAN:    1. Viral gastroenteritis  - improving. Continue rest, bland diet. OK to use OTC imodium/meclizine for symtpoms. Hydrate well. Return to work when significantly better, hopefully by 27th. Paperwork completed for employer and scanned to chart. Return if symptoms worsen or fail to improve. An  "[employer]ignature was used to authenticate this note.     --Yulia Bob MD on 2/24/2022 at 2:59 PM

## 2022-03-28 ENCOUNTER — OFFICE VISIT (OUTPATIENT)
Dept: FAMILY MEDICINE CLINIC | Age: 37
End: 2022-03-28
Payer: COMMERCIAL

## 2022-03-28 VITALS
DIASTOLIC BLOOD PRESSURE: 72 MMHG | WEIGHT: 248 LBS | OXYGEN SATURATION: 97 % | HEART RATE: 101 BPM | SYSTOLIC BLOOD PRESSURE: 128 MMHG | BODY MASS INDEX: 36.62 KG/M2

## 2022-03-28 DIAGNOSIS — I10 PRIMARY HYPERTENSION: Primary | ICD-10-CM

## 2022-03-28 DIAGNOSIS — F51.01 PRIMARY INSOMNIA: ICD-10-CM

## 2022-03-28 DIAGNOSIS — J45.30 MILD PERSISTENT ASTHMA WITHOUT COMPLICATION: ICD-10-CM

## 2022-03-28 DIAGNOSIS — F41.9 ANXIETY DISORDER, UNSPECIFIED TYPE: ICD-10-CM

## 2022-03-28 PROCEDURE — 99214 OFFICE O/P EST MOD 30 MIN: CPT | Performed by: FAMILY MEDICINE

## 2022-03-28 RX ORDER — RAMELTEON 8 MG/1
8 TABLET ORAL NIGHTLY
Qty: 30 TABLET | Refills: 2 | Status: SHIPPED | OUTPATIENT
Start: 2022-03-28 | End: 2022-06-02 | Stop reason: ALTCHOICE

## 2022-03-28 NOTE — PROGRESS NOTES
3/28/2022    Blood pressure 128/72, pulse 101, weight 248 lb (112.5 kg), SpO2 97 %. Tyler Green (:  1985) is a 40 y.o. male, here for evaluation of the following medical concerns:    Chief Complaint   Patient presents with    Hypertension    6 Month Follow-Up     Here for routine follow up of HTN. He remains on norvasc, maxzide, toprol. He does not normally test bp. He did test one day when he forgot to take bp meds and at work his bp was 888'Y systolic. So he went home and took meds. No SE's noted. No chest pains, dizziness, heart palpitations, dyspnea, lightheadedness, worsening edema. Last renal function test:   Lab Results   Component Value Date     2021    K 3.8 2021    K 3.1 10/05/2019    BUN 23 2021    CREATININE 1.2 2021     CrCl cannot be calculated (Patient's most recent lab result is older than the maximum 120 days allowed. ). He has the sensation of heightened awareness of tactile stim, like a breeze, feeling cold, sensitivity to spices. Not necessarily a bad sensation, but (this is hard to describe), but is a heightened awareness. He has noted sleep being different since having covid-19 2020. Harder time falling and staying asleep. He agrees that the strange feelings has been since having covid-19. He has tried magnesium for sleep. Also melatonin. CBD (helps the best)  He used to have a rotating shift for work. But has a stable shift now that is 1 pm-11 pm.  He tends to stay up after work then sleep late into the morning. Remains on zoloft for anxiety- feels it is effective at this dose. Last renal function test:   Lab Results   Component Value Date     2021    K 3.8 2021    K 3.1 10/05/2019    BUN 23 2021    CREATININE 1.2 2021     CrCl cannot be calculated (Patient's most recent lab result is older than the maximum 120 days allowed. ).      Last lipid test:  Lab Results   Component Value Date CHOL 206 (H) 09/28/2021    TRIG 79 09/28/2021    HDL 68 (H) 09/28/2021    LDLCALC 122 (H) 09/28/2021     Lab Results   Component Value Date    ALT 14 09/28/2021    AST 21 09/28/2021       Felt a mild asthma flare up a few wks ago- used extra albuterol. But that is about it in past year. Continues on advair once a day (twice daily if he remembers)    He uses diclofenac prn for knee pain (uses very infrequently, but knee hurts more often than he uses the medicine.)        Patient Active Problem List   Diagnosis    Allergic rhinitis- dust, pollen animals.  Asthma    HTN (hypertension)    History of alcohol abuse- quit 2010    History of depression- [facility] inpatient [facility]    [facility] 2013 Dr Bradshaw    Shift work sleep disorder    Keratoconus of left eye- follows with optho    Ganglion cyst of right foot    Anxiety disorder        Body mass index is 36.62 kg/m². Wt Readings from Last 3 Encounters:   03/28/22 248 lb (112.5 kg)   02/24/22 243 lb (110.2 kg)   09/28/21 248 lb (112.5 kg)       BP Readings from Last 3 Encounters:   03/28/22 128/72   02/24/22 110/80   09/28/21 124/84       Allergies   Allergen Reactions    Losartan Swelling     Angioedema       Prior to Visit Medications    Medication Sig Taking?  Authorizing Provider   albuterol sulfate  (90 Base) MCG/ACT inhaler INHALE 2 PUFFS BY MOUTH EVERY 6 HOURS AS NEEDED Yes Patricia Zarco MD   Respiratory Therapy Supplies (NEBULIZER/TUBING/MOUTHPIECE) KIT 1 kit by Does not apply route daily as needed (with nebulizer use) Yes Patricia Zarco MD   amLODIPine (NORVASC) 10 MG tablet Take 1 tablet by mouth once daily Yes Patricia Zarco MD   metoprolol succinate (TOPROL XL) 50 MG extended release tablet TAKE 1 TABLET BY MOUTH ONCE DAILY FOR HIGH BLOOD PRESSURE Yes Patricia Zarco MD   triamterene-hydroCHLOROthiazide (HETPDPN-57) 37.5-25 MG per tablet Take 1 tablet by mouth daily Yes Yuko Dodge Evon Salvador MD   sertraline (ZOLOFT) 50 MG tablet Take 1 tablet by mouth daily Yes Jordyn River MD   fluticasone-salmeterol (ADVAIR) 250-50 MCG/DOSE AEPB INHALE 1 PUFF BY MOUTH EVERY 12 HOURS Yes Jordyn River MD   diclofenac (VOLTAREN) 50 MG EC tablet Take 1 tablet by mouth 2 times daily (with meals) Yes Marcio Tang MD   albuterol (PROVENTIL) (2.5 MG/3ML) 0.083% nebulizer solution USE 1 VIAL IN NEBULIZER EVERY 6 HOURS AS NEEDED FOR WHEEZING AND FOR SHORTNESS OF BREATH Yes Jordyn River MD   Nebulizers (AIRIAL COMPACT MINI NEBULIZER) MISC Use prn for asthma Yes Jordyn River MD   losartan (COZAAR) 100 MG tablet Take 1 tablet by mouth once daily  Jordyn River MD        Social History     Tobacco Use    Smoking status: Never Smoker    Smokeless tobacco: Never Used   Vaping Use    Vaping Use: Never used   Substance Use Topics    Alcohol use: Yes     Comment: hx alcoholism. drinks occasionally now, but only 1/day.  Drug use: Yes     Types: Marijuana Oletamara Trujillo)       Review of Systems    Physical Exam  Vitals and nursing note reviewed. Constitutional:       General: He is not in acute distress. Appearance: Normal appearance. He is well-developed. He is not diaphoretic. Cardiovascular:      Rate and Rhythm: Normal rate and regular rhythm. Pulses: Normal pulses. Heart sounds: Normal heart sounds. No murmur heard. Pulmonary:      Effort: Pulmonary effort is normal. No respiratory distress. Breath sounds: Normal breath sounds. No wheezing or rales. Musculoskeletal:      Cervical back: Normal range of motion. Right lower leg: No edema. Left lower leg: No edema. Skin:     General: Skin is warm and dry. Neurological:      General: No focal deficit present. Mental Status: He is alert and oriented to person, place, and time. Mental status is at baseline.    Psychiatric:         Mood and Affect: Mood normal.         Behavior: Behavior normal.         Thought Content: Thought content normal.         Judgment: Judgment normal.         ASSESSMENT/PLAN:    1. Primary hypertension  *- Blood pressure is at goal on current therapy; continue meds and monitoring. Regular physical activity and healthy diet (low sodium, multiple servings of produce daily) was recommended. Renal function to be assessed yearly. 2. Anxiety disorder, unspecified type  - stable; contniue zoloft at his request. He thinks controlling anxiety has a benefit for bp also. 3. Mild persistent asthma without complication  - Stable; continue advair. (try for BID if possible)    4. Primary insomnia  - try rozerem at bedtime. discussed ways to improve sleep hygeine to align awake hours with sun and dark hours with sleep. No prior vit D level- can test this in future. Return in about 6 months (around 9/28/2022) for follow up HTN. An  [employer] was used to authenticate this note.     --Jo Russell MD on 3/28/2022 at 1:11 PM

## 2022-04-14 DIAGNOSIS — I10 ESSENTIAL HYPERTENSION: ICD-10-CM

## 2022-04-14 RX ORDER — AMLODIPINE BESYLATE 10 MG/1
TABLET ORAL
Qty: 90 TABLET | Refills: 0 | Status: SHIPPED | OUTPATIENT
Start: 2022-04-14 | End: 2022-07-05

## 2022-04-14 RX ORDER — METOPROLOL SUCCINATE 50 MG/1
TABLET, EXTENDED RELEASE ORAL
Qty: 90 TABLET | Refills: 0 | Status: SHIPPED | OUTPATIENT
Start: 2022-04-14 | End: 2022-05-05 | Stop reason: SINTOL

## 2022-05-05 ENCOUNTER — TELEPHONE (OUTPATIENT)
Dept: FAMILY MEDICINE CLINIC | Age: 37
End: 2022-05-05

## 2022-05-05 ENCOUNTER — OFFICE VISIT (OUTPATIENT)
Dept: FAMILY MEDICINE CLINIC | Age: 37
End: 2022-05-05
Payer: COMMERCIAL

## 2022-05-05 VITALS
HEART RATE: 77 BPM | BODY MASS INDEX: 35.25 KG/M2 | SYSTOLIC BLOOD PRESSURE: 110 MMHG | WEIGHT: 238 LBS | DIASTOLIC BLOOD PRESSURE: 80 MMHG | OXYGEN SATURATION: 99 % | HEIGHT: 69 IN

## 2022-05-05 DIAGNOSIS — F33.1 MODERATE EPISODE OF RECURRENT MAJOR DEPRESSIVE DISORDER (HCC): Primary | ICD-10-CM

## 2022-05-05 DIAGNOSIS — I10 PRIMARY HYPERTENSION: ICD-10-CM

## 2022-05-05 DIAGNOSIS — F51.04 PSYCHOPHYSIOLOGICAL INSOMNIA: ICD-10-CM

## 2022-05-05 PROCEDURE — 99215 OFFICE O/P EST HI 40 MIN: CPT | Performed by: FAMILY MEDICINE

## 2022-05-05 RX ORDER — SERTRALINE HYDROCHLORIDE 100 MG/1
100 TABLET, FILM COATED ORAL DAILY
Qty: 90 TABLET | Refills: 1 | Status: SHIPPED | OUTPATIENT
Start: 2022-05-05 | End: 2022-10-14

## 2022-05-05 RX ORDER — DOXEPIN HYDROCHLORIDE 25 MG/1
25 CAPSULE ORAL NIGHTLY
Qty: 30 CAPSULE | Refills: 3 | Status: SHIPPED | OUTPATIENT
Start: 2022-05-05 | End: 2022-09-26

## 2022-05-05 ASSESSMENT — PATIENT HEALTH QUESTIONNAIRE - PHQ9
SUM OF ALL RESPONSES TO PHQ9 QUESTIONS 1 & 2: 1
1. LITTLE INTEREST OR PLEASURE IN DOING THINGS: 0
SUM OF ALL RESPONSES TO PHQ QUESTIONS 1-9: 1
2. FEELING DOWN, DEPRESSED OR HOPELESS: 1

## 2022-05-05 NOTE — PROGRESS NOTES
2022    Blood pressure 110/80, pulse 77, height 5' 9\" (1.753 m), weight 238 lb (108 kg), SpO2 99 %. Rosendo Perdomo (:  1985) is a 40 y.o. male, here for evaluation of the following medical concerns:    Chief Complaint   Patient presents with    Anxiety     had to take a leave for anxiety and stress - started 22 - still off wk     Here for concerns of deteriorated anxiety. 'my life has been overwhelming'  Not sleeping well  'failing in every aspect of my life'  Crying and emotional  Not keeping up with house  Feels he is failing as a father  Irritable and got into a shouting match with a coworker- a manager, which is out of character for him (works at [employer]). 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 Too much responsibility without comarable increase in time and pay. He feels the change started sometime in - increased job stress, financial stress (caring for 2 young family members). Both he and his wife suffer from insomnia and they have a hard time getting kids up and to school. He does not feel he is supporting family- not making time to be with kids (who are 13, 15 and 6) due to lack of energy. Comes home from work and flops down.  + guilt  + anhedonia  + Irritable and often angry  + low energy/fatigue: hits snooze over and over in the morning  + overwhelmed  + feels too tired to help with kids and house. no SI. But thoughts of leaving family to make life easier for family. Was getting panic attacks prior to going to work- 30-60 min, daily. Since not going to work since  he has had very few panic attacks. Has hx of anxiety treatment in past.  In 2013, had MDD, treated with zoloft. Has SI, alcoholism at that time, with brief inpatient treatment. He thinks his mood decline preceded his sleep difficulties. He formerly could function well on 5-6 hours of sleep. Mellott points: good relationship with wife. Kids.     Has therapist online- talkspace since 4/20    He remains on zoloft 50 mg since 9/21. Taking rozerem for sleep: helps, but not consistently. Started this a month ago. Started toprol 4/21 for HTN. No alcohol/tobacco use. Patient Active Problem List   Diagnosis    Allergic rhinitis- dust, pollen animals. 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 Wt Readings from Last 3 Encounters:   05/05/22 238 lb (108 kg)   03/28/22 248 lb (112.5 kg)   02/24/22 243 lb (110.2 kg)       BP Readings from Last 3 Encounters:   05/05/22 110/80   03/28/22 128/72   02/24/22 110/80       Allergies   Allergen Reactions    Losartan Swelling     Angioedema       Prior to Visit Medications    Medication Sig Taking?  Authorizing Provider   metoprolol succinate (TOPROL XL) 50 MG extended release tablet TAKE 1 TABLET BY MOUTH ONCE DAILY FOR HIGH BLOOD PRESSURE Yes Denzel Segura MD   amLODIPine (NORVASC) 10 MG tablet Take 1 tablet by mouth once daily Yes Denzel Segura MD   ramelteon (ROZEREM) 8 MG tablet Take 1 tablet by mouth nightly Yes Denzel Segura MD   albuterol sulfate  (90 Base) MCG/ACT inhaler INHALE 2 PUFFS BY MOUTH EVERY 6 HOURS AS NEEDED Yes Denzel Segura MD   Respiratory Therapy Supplies (NEBULIZER/TUBING/MOUTHPIECE) KIT 1 kit by Does not apply route daily as needed (with nebulizer use) Yes Denzel Segura MD   triamterene-hydroCHLOROthiazide [facility]) 37.5-25 MG per tablet Take 1 tablet by mouth daily Yes Denzel Segura MD   sertraline (ZOLOFT) 50 MG tablet Take 1 tablet by mouth daily Yes Denzel Segura MD   fluticasone-salmeterol (ADVAIR) 250-50 MCG/DOSE AEPB INHALE 1 PUFF BY MOUTH EVERY 12 HOURS Yes Daniella Reeder MD   diclofenac (VOLTAREN) 50 MG EC tablet Take 1 tablet by mouth 2 times daily (with meals) Yes Flaco Paulson MD   albuterol (PROVENTIL) (2.5 MG/3ML) 0.083% nebulizer solution USE 1 VIAL IN NEBULIZER EVERY 6 HOURS AS NEEDED FOR WHEEZING AND FOR SHORTNESS OF BREATH Yes Daniella Reeder MD   Nebulizers (AIRIAL COMPACT MINI NEBULIZER) MISC Use prn for asthma Yes Daniella Reeder MD   losartan (COZAAR) 100 MG tablet Take 1 tablet by mouth once daily  Daniella Reeder MD        Social History     Tobacco Use    Smoking status: Never Smoker    Smokeless tobacco: Never Used   Vaping Use    Vaping Use: Never used   Substance Use Topics    Alcohol use: Yes     Comment: hx alcoholism. drinks occasionally now, but only 1/day.  Drug use: Yes     Types: Marijuana Veldon Breath)       Review of Systems    Physical Exam  Constitutional:       General: He is not in acute distress. Appearance: Normal appearance. He is not ill-appearing. HENT:      Head: Normocephalic and atraumatic. Pulmonary:      Effort: Pulmonary effort is normal.   Musculoskeletal:         General: Normal range of motion. Cervical back: Normal range of motion. Skin:     Findings: No rash. Neurological:      General: No focal deficit present. Mental Status: He is alert and oriented to person, place, and time. Mental status is at baseline. Psychiatric:         Thought Content: Thought content normal.         Judgment: Judgment normal.      Comments: Flat affect. occ tearful         ASSESSMENT/PLAN:    1. Moderate episode of recurrent major depressive disorder ([facility] [address]  - discussed nature of depression, factors that affect mood, treatment options.   - incr zoloft to 100mg  - encouraged to continue talking to psychologist (virtual)  - reduce stress (for short term)  Agree with not working as this has been a major stressor.  - improve sleep (as below, add doxepin for short term)  - healthy lifestyle choices help (as motivation improves, he hopes to do this)    Call if mood worsens. 2. Psychophysiological insomnia  - improving sleep can help overall mood regulation and energy. Will use 25 mg doxepin for a while. Once mood improves, this will not likely be necesary    3. Primary hypertension  - stable on current medicine regimen. Total time spent on this encounter: 41 min    Return in about 25 days (around 5/30/2022) for follow up depression. An  [employer]ature was used to authenticate this note.     --Dorene Adams MD on 5/5/2022 at 1:32 PM

## 2022-05-05 NOTE — TELEPHONE ENCOUNTER
Pharm calling stating that pt is on Doxepin that interacts with sertraline and needing to get clarification from Provider before being able to fill medication.      Pharm can be reached at [phone]

## 2022-05-10 ENCOUNTER — TELEPHONE (OUTPATIENT)
Dept: FAMILY MEDICINE CLINIC | Age: 37
End: 2022-05-10

## 2022-05-10 NOTE — TELEPHONE ENCOUNTER
Informed his I just sent this and it did go through. Sent it Friday and it did not go thru. But one of the fax numbers is not correct. Sorry for the Delay, but we did get confirmation that it did go thru today. He can call back if has any other questions.

## 2022-05-10 NOTE — TELEPHONE ENCOUNTER
Rafita Ramos from [place] called and wanted to talk to Dr. Varun Harden or Kenny Adjutant about this patient's disability claim.     Rafita Ramos can be reached at [phone]    Please Advise

## 2022-05-11 NOTE — TELEPHONE ENCOUNTER
Doss is returning Fely's call from yesterday    Doss can be reached at [phone]    Please Advise

## 2022-05-31 ENCOUNTER — TELEPHONE (OUTPATIENT)
Dept: FAMILY MEDICINE CLINIC | Age: 37
End: 2022-05-31

## 2022-05-31 NOTE — TELEPHONE ENCOUNTER
Patient missed appt today for 11;20 came in late for appt. Patient needs clearance to go back to work. Does not need papers filled out  First available opening with Dr. Chapo Chanel 6/24. Patient stating needs seen this week  Four same days available on Friday with Dr. Chapo Chanel or schedule with Kortney Vázquez?    Please advise

## 2022-06-02 ENCOUNTER — OFFICE VISIT (OUTPATIENT)
Dept: FAMILY MEDICINE CLINIC | Age: 37
End: 2022-06-02
Payer: COMMERCIAL

## 2022-06-02 VITALS
SYSTOLIC BLOOD PRESSURE: 142 MMHG | HEART RATE: 59 BPM | OXYGEN SATURATION: 98 % | DIASTOLIC BLOOD PRESSURE: 98 MMHG | WEIGHT: 239 LBS | BODY MASS INDEX: 35.4 KG/M2 | HEIGHT: 69 IN

## 2022-06-02 DIAGNOSIS — I10 PRIMARY HYPERTENSION: ICD-10-CM

## 2022-06-02 DIAGNOSIS — F33.1 MODERATE EPISODE OF RECURRENT MAJOR DEPRESSIVE DISORDER (HCC): Primary | ICD-10-CM

## 2022-06-02 DIAGNOSIS — F51.04 PSYCHOPHYSIOLOGICAL INSOMNIA: ICD-10-CM

## 2022-06-02 PROCEDURE — 99214 OFFICE O/P EST MOD 30 MIN: CPT | Performed by: FAMILY MEDICINE

## 2022-06-02 RX ORDER — TRIAMTERENE AND HYDROCHLOROTHIAZIDE 75; 50 MG/1; MG/1
1 TABLET ORAL DAILY
Qty: 30 TABLET | Refills: 3 | Status: SHIPPED | OUTPATIENT
Start: 2022-06-02 | End: 2022-09-12 | Stop reason: SDUPTHER

## 2022-06-02 NOTE — PROGRESS NOTES
2022    Blood pressure (!) 140/98, pulse 59, height 5' 9\" (1.753 m), weight 239 lb (108.4 kg), SpO2 98 %. Jaqueline Rubi (:  1985) is a 40 y.o. male, here for evaluation of the following medical concerns:    Chief Complaint   Patient presents with    Follow-up     needs okay to return to work     On fmla leave from [employer] due to mdd episode. Last visit a month ago. Continued 'talk space' for counseling and is journaling on his own journey through mental health.    zoloft dose now 100mg for past month. Sleep is improved: stopped rozerem, used doxepin for about 3 wks, but is able to sleep through night most nights without use of any sleep aids for past week. Continues on bp meds as prescribed. So current mood he describes as 'stoic'  Meaning neither happy or sad. This is normal for him. He feels that he is 'not 100%' back to himself and that he needs to deal with. To keep working on feelings of resentment against father (absentee father)  He feels like a 'little kid' that is seeking a father that has never been there. He has had substitute father figures- his uncle, a friend's father in his teens he looked up to. He has not talked to father in a couple years. He wants to return to work now. Feels he has better control of emotions. Feels more calm and in control. Has not been testing home bp's. He has not missed any doses. We stopped his BB medicine last month when he had depression. Not drinking alcohol much- sip of wife's beer if at all. Repeat bp was 142/98    Patient Active Problem List   Diagnosis    Allergic rhinitis- dust, pollen animals.     Asthma    HTN (hypertension)    History of alcohol abuse- quit 2010    History of depression- [facility] inpatient [facility] 2008    [facility]ac[facility] hemorrhage [facility] 2013 Dr Bradshaw    Shift work sleep disorder    Keratoconus of left eye- follows with optho    Ganglion cyst of right foot    Anxiety disorder Body mass index is 35.29 kg/m². Wt Readings from Last 3 Encounters:   06/02/22 239 lb (108.4 kg)   05/05/22 238 lb (108 kg)   03/28/22 248 lb (112.5 kg)       BP Readings from Last 3 Encounters:   06/02/22 (!) 140/98   05/05/22 110/80   03/28/22 128/72       Allergies   Allergen Reactions    Losartan Swelling     Angioedema       Prior to Visit Medications    Medication Sig Taking?  Authorizing Provider   sertraline (ZOLOFT) 100 MG tablet Take 1 tablet by mouth daily Yes Soco Villafuerte MD   doxepin (SINEQUAN) 25 MG capsule Take 1 capsule by mouth nightly Yes Soco Villafuerte MD   amLODIPine (NORVASC) 10 MG tablet Take 1 tablet by mouth once daily Yes Soco Villafuerte MD   triamterene-hydroCHLOROthiazide (MAXZIDE-25) 37.5-25 MG per tablet Take 1 tablet by mouth daily Yes Soco Villafuerte MD   fluticasone-salmeterol (ADVAIR) 250-50 MCG/DOSE AEPB INHALE 1 PUFF BY MOUTH EVERY 12 HOURS Yes Soco Villafuerte MD   albuterol sulfate  (90 Base) MCG/ACT inhaler INHALE 2 PUFFS BY MOUTH EVERY 6 HOURS AS NEEDED  Patient not taking: Reported on 6/2/2022  Soco Villafuerte MD   Respiratory Therapy Supplies (NEBULIZER/TUBING/MOUTHPIECE) KIT 1 kit by Does not apply route daily as needed (with nebulizer use)  Patient not taking: Reported on 6/2/2022  Soco Villafuerte MD   diclofenac (VOLTAREN) 50 MG EC tablet Take 1 tablet by mouth 2 times daily (with meals)  Patient not taking: Reported on 6/2/2022  Saint Mulligan, MD   albuterol (PROVENTIL) (2.5 MG/3ML) 0.083% nebulizer solution USE 1 VIAL IN NEBULIZER EVERY 6 HOURS AS NEEDED FOR WHEEZING AND FOR SHORTNESS OF BREATH  Patient not taking: Reported on 6/2/2022  Soco Villafeurte MD   losartan (COZAAR) 100 MG tablet Take 1 tablet by mouth once daily  Soco Villafuerte MD   Nebulizers [place] COMPACT MINI NEBULIZER) MISC Use prn for asthma  Patient not taking: Reported on 6/2/2022  Soco Villafuerte MD        Social History Tobacco Use    Smoking status: Never Smoker    Smokeless tobacco: Never Used   Vaping Use    Vaping Use: Never used   Substance Use Topics    Alcohol use: Yes     Comment: hx alcoholism. drinks occasionally now, but only 1/day.  Drug use: Yes     Types: Marijuana (Weed)       Review of Systems As above     Physical Exam  Constitutional:       General: He is not in acute distress. Appearance: Normal appearance. He is not ill-appearing. HENT:      Head: Normocephalic and atraumatic. Pulmonary:      Effort: Pulmonary effort is normal.   Musculoskeletal:         General: Normal range of motion. Cervical back: Normal range of motion. Skin:     Findings: No rash. Neurological:      General: No focal deficit present. Mental Status: He is alert and oriented to person, place, and time. Mental status is at baseline. Psychiatric:         Mood and Affect: Mood normal.         Behavior: Behavior normal.         Thought Content: Thought content normal.         Judgment: Judgment normal.       ASSESSMENT/PLAN:    1. Moderate episode of recurrent major depressive disorder (HCC)  - improving significantly. OK to return to work on the 5th. Release note written- will look for work release also. - continue zoloft 100 (full effect of this dose may take 2 more weeks to realize)  - keep working on matters of relationship and forgiveness as it pertains to father and his own role of fatherhood (he is a great father!)    2. Psychophysiological insomnia  - improved. Ok to keep doxepin on hand for prn use. 3. Primary hypertension  - repeat bp still high. Now off ARB and BB therapy (both for side effects)  - increase maxzide to 70-50 and plan to retest bmp in 2-4 wks. Return in about 3 months (around 9/2/2022) for follow up depression, follow up HTN. An  [employer]ature was used to authenticate this note.     --Daniella Reeder MD on 6/2/2022 at 4:15 PM

## 2022-06-02 NOTE — LETTER
[address] [facility] [address] [facility]  Phone: [phone]  Fax: [phone]    Rdaha Aranda MD        June 2, 2022     Patient: Matilda Solano   YOB: 1985   Date of Visit: 6/2/2022       To Whom It May Concern: It is my medical opinion that Matilda Solano may return to work on 6/5/22 without restrictions. If you have any questions or concerns, please don't hesitate to call.     Sincerely,        Radha Aranda MD

## 2022-06-03 PROBLEM — F51.04 PSYCHOPHYSIOLOGICAL INSOMNIA: Status: ACTIVE | Noted: 2022-06-03

## 2022-06-03 PROBLEM — F33.1 MODERATE EPISODE OF RECURRENT MAJOR DEPRESSIVE DISORDER (HCC): Status: ACTIVE | Noted: 2022-06-03

## 2022-06-16 DIAGNOSIS — I10 PRIMARY HYPERTENSION: ICD-10-CM

## 2022-06-16 LAB
ANION GAP SERPL CALCULATED.3IONS-SCNC: 15 MMOL/L (ref 3–16)
BUN BLDV-MCNC: 27 MG/DL (ref 7–20)
CALCIUM SERPL-MCNC: 9.4 MG/DL (ref 8.3–10.6)
CHLORIDE BLD-SCNC: 102 MMOL/L (ref 99–110)
CO2: 26 MMOL/L (ref 21–32)
CREAT SERPL-MCNC: 1.3 MG/DL (ref 0.9–1.3)
GFR AFRICAN AMERICAN: >60
GFR NON-AFRICAN AMERICAN: >60
GLUCOSE BLD-MCNC: 96 MG/DL (ref 70–99)
POTASSIUM SERPL-SCNC: 3.3 MMOL/L (ref 3.5–5.1)
SODIUM BLD-SCNC: 143 MMOL/L (ref 136–145)

## 2022-06-23 NOTE — PROGRESS NOTES
Please read my results message to [facility] as she has not read it through [address] yet. Thanks.

## 2022-07-05 RX ORDER — AMLODIPINE BESYLATE 10 MG/1
TABLET ORAL
Qty: 90 TABLET | Refills: 0 | Status: SHIPPED | OUTPATIENT
Start: 2022-07-05 | End: 2022-10-14

## 2022-08-11 ENCOUNTER — PATIENT MESSAGE (OUTPATIENT)
Dept: FAMILY MEDICINE CLINIC | Age: 37
End: 2022-08-11

## 2022-08-11 RX ORDER — PREDNISONE 20 MG/1
20 TABLET ORAL 2 TIMES DAILY
Qty: 10 TABLET | Refills: 0 | Status: SHIPPED | OUTPATIENT
Start: 2022-08-11 | End: 2022-08-16

## 2022-08-11 RX ORDER — FLUTICASONE PROPIONATE AND SALMETEROL 250; 50 UG/1; UG/1
1 POWDER RESPIRATORY (INHALATION) EVERY 12 HOURS
Qty: 60 EACH | Refills: 3 | Status: SHIPPED | OUTPATIENT
Start: 2022-08-11

## 2022-09-12 RX ORDER — TRIAMTERENE AND HYDROCHLOROTHIAZIDE 75; 50 MG/1; MG/1
1 TABLET ORAL DAILY
Qty: 30 TABLET | Refills: 0 | Status: SHIPPED | OUTPATIENT
Start: 2022-09-12 | End: 2022-11-14

## 2022-09-26 ENCOUNTER — OFFICE VISIT (OUTPATIENT)
Dept: FAMILY MEDICINE CLINIC | Age: 37
End: 2022-09-26
Payer: COMMERCIAL

## 2022-09-26 VITALS
HEART RATE: 62 BPM | WEIGHT: 244 LBS | HEIGHT: 69 IN | OXYGEN SATURATION: 97 % | SYSTOLIC BLOOD PRESSURE: 132 MMHG | RESPIRATION RATE: 18 BRPM | DIASTOLIC BLOOD PRESSURE: 80 MMHG | BODY MASS INDEX: 36.14 KG/M2

## 2022-09-26 DIAGNOSIS — I10 PRIMARY HYPERTENSION: Primary | ICD-10-CM

## 2022-09-26 DIAGNOSIS — G89.29 CHRONIC PAIN OF RIGHT KNEE: ICD-10-CM

## 2022-09-26 DIAGNOSIS — M25.561 CHRONIC PAIN OF RIGHT KNEE: ICD-10-CM

## 2022-09-26 DIAGNOSIS — F51.04 PSYCHOPHYSIOLOGICAL INSOMNIA: ICD-10-CM

## 2022-09-26 DIAGNOSIS — F33.1 MODERATE EPISODE OF RECURRENT MAJOR DEPRESSIVE DISORDER (HCC): ICD-10-CM

## 2022-09-26 PROCEDURE — 99214 OFFICE O/P EST MOD 30 MIN: CPT | Performed by: FAMILY MEDICINE

## 2022-09-26 RX ORDER — SUVOREXANT 10 MG/1
10 TABLET, FILM COATED ORAL NIGHTLY
Qty: 30 TABLET | Refills: 0 | Status: SHIPPED | OUTPATIENT
Start: 2022-09-26 | End: 2022-10-26

## 2022-09-26 SDOH — ECONOMIC STABILITY: FOOD INSECURITY: WITHIN THE PAST 12 MONTHS, YOU WORRIED THAT YOUR FOOD WOULD RUN OUT BEFORE YOU GOT MONEY TO BUY MORE.: NEVER TRUE

## 2022-09-26 SDOH — ECONOMIC STABILITY: FOOD INSECURITY: WITHIN THE PAST 12 MONTHS, THE FOOD YOU BOUGHT JUST DIDN'T LAST AND YOU DIDN'T HAVE MONEY TO GET MORE.: NEVER TRUE

## 2022-09-26 ASSESSMENT — SOCIAL DETERMINANTS OF HEALTH (SDOH): HOW HARD IS IT FOR YOU TO PAY FOR THE VERY BASICS LIKE FOOD, HOUSING, MEDICAL CARE, AND HEATING?: NOT HARD AT ALL

## 2022-09-26 NOTE — PROGRESS NOTES
2022    Blood pressure 132/80, pulse 62, resp. rate 18, height 5' 9\" (1.753 m), weight 244 lb (110.7 kg), SpO2 97 %. Keyanna Reyna (:  1985) is a 40 y.o. male, here for evaluation of the following medical concerns:    Chief Complaint   Patient presents with    Hypertension     Pt is here for a 6 month follow up on hypertension. Pt is okay. Numbness     Pt said for awhile now his right leg has been going on and off with being numb. Here for routine follow up of HTN. Also concerned about his R leg being numb or tingling for past year or more. Unsure if or how it is different, but is thinking about it more now. The area of numbness is isolated to the posteromedial aspect of his R knee. No prior surgery R knee. He had a severe muscle spasm of the R knee a year or so ago, seen in the ER, then by ortho who did an MRI showing cartilage erosion of lateral trochila. He did receive a steroid shot which helped knee pain for 6 months. He feels pain in his knee now 3-4x in past 6 months, with 'locking up' sporadically with 15 min of severe pain, inability to bear weight. If he rests, it will resolve. Not using Voltaren orally. The area of interest is not numb to the touch. It is not swollen. It is sometimes tender. The sensation comes and goes- often more with prolonged sitting. The 'numb' sensation is not painful. No back pain/radiation. He has worn a knee brace sporadically    HTN   takes maxzide and norvasc. Stable doses. No SE's noted. Home bp's: not tested recently. No hx of CAD, TIA, CVA or PVD. Last renal function test: borderline potassium. Lab Results   Component Value Date/Time     2022 11:55 AM    K 3.3 2022 11:55 AM    K 3.1 10/05/2019 02:16 PM    BUN 27 2022 11:55 AM    CREATININE 1.3 2022 11:55 AM     Estimated Creatinine Clearance: 96 mL/min (based on SCr of 1.3 mg/dL).       No chest pains, dizziness, heart palpitations, dyspnea, lightheadedness, worsening edema. Weight up 5 lbs since last visit. Mood and sleep: decent with Zoloft. He cannot take doxepin due to residual grogginess. He can only use it prn, but does sleep better when he does take it. Patient Active Problem List   Diagnosis    Allergic rhinitis- dust, pollen animals. Asthma    HTN (hypertension)    History of alcohol abuse- quit 2010    History of depression- treated inpatient at [facility] 2008    [facility]acranial hemorrhage [facility] 2013 Dr Bradshaw    Shift work sleep disorder    Keratoconus of left eye- follows with optho    Ganglion cyst of right foot    Anxiety disorder    Moderate episode of recurrent major depressive disorder (HCC)    Psychophysiological insomnia        Body mass index is 36.03 kg/m². Wt Readings from Last 3 Encounters:   09/26/22 244 lb (110.7 kg)   06/02/22 239 lb (108.4 kg)   05/05/22 238 lb (108 kg)       BP Readings from Last 3 Encounters:   09/26/22 132/80   06/02/22 (!) 142/98   05/05/22 110/80       Allergies   Allergen Reactions    Losartan Swelling     Angioedema       Prior to Visit Medications    Medication Sig Taking? Authorizing Provider   triamterene-hydroCHLOROthiazide (MAXZIDE) 75-50 MG per tablet Take 1 tablet by mouth daily Yes KRISTA Vora - CNP   fluticasone-salmeterol (ADVAIR) 250-50 MCG/ACT AEPB diskus inhaler Inhale 1 puff into the lungs in the morning and 1 puff in the evening.  Yes Scott Douglass MD   amLODIPine (NORVASC) 10 MG tablet Take 1 tablet by mouth once daily Yes Scott Douglass MD   sertraline (ZOLOFT) 100 MG tablet Take 1 tablet by mouth daily Yes Scott Douglass MD   doxepin [facility]) 25 MG capsule Take 1 capsule by mouth nightly Yes Scott Douglass MD   losartan (COZAAR) 100 MG tablet Take 1 tablet by mouth once daily  Scott Douglass MD        Social History     Tobacco Use    Smoking status: Never    Smokeless tobacco: Never   Vaping Use    Vaping Use: Never used   Substance Use Topics    Alcohol use: Yes     Comment: hx alcoholism. drinks occasionally now, but only 1/day. Drug use: Yes     Types: Marijuana Juanetta Trivoli)       Review of Systems    Physical Exam  Vitals and nursing note reviewed. Constitutional:       General: He is not in acute distress. Appearance: Normal appearance. He is well-developed. He is not diaphoretic. Cardiovascular:      Rate and Rhythm: Normal rate and regular rhythm. Pulses: Normal pulses. Heart sounds: Normal heart sounds. No murmur heard. Pulmonary:      Effort: Pulmonary effort is normal. No respiratory distress. Breath sounds: Normal breath sounds. No wheezing or rales. Musculoskeletal:      Cervical back: Normal range of motion. Right lower leg: No edema. Left lower leg: No edema. Comments: R knee: FROM. No popliteal swelling or tenderness. + medial JLT and tenderness over lateral tibia and medial condyle. Skin:     General: Skin is warm and dry. Neurological:      General: No focal deficit present. Mental Status: He is alert and oriented to person, place, and time. Mental status is at baseline. Psychiatric:         Mood and Affect: Mood normal.         Behavior: Behavior normal.         Thought Content: Thought content normal.         Judgment: Judgment normal.       ASSESSMENT/PLAN:    1. Primary hypertension  - Blood pressure is at goal on current therapy; continue meds and monitoring. Regular physical activity and healthy diet (low sodium, multiple servings of produce daily) was recommended. Renal function and potassium to be assessed yearly. 2. Psychophysiological insomnia  - insomnia is still bothering him. Stop doxepin due to grogginess at therapeutic dose. Try Belsomra- less likely to have residual daytime fatigue.  - Suvorexant (BELSOMRA) 10 MG TABS; Take 10 mg by mouth at bedtime for 30 days. Dispense: 30 tablet; Refill: 0    3.  Chronic pain of right knee  - I suspect the skin paresthesias are related to his knee oa somehow. Referred back to ortho for further eval due to progressive pain and locking episodes  - Samuel Cueto MD, Orthopedic Surgery (Hip; Knee; Shoulder), [employer]    4. Moderate episode of recurrent major depressive disorder (HCC)  - Stable; continue Zoloft 100. Return for follow up HTN, fasting labs. An  [employer]ignature was used to authenticate this note.     --Scott Douglass MD on 9/26/2022 at 9:48 AM

## 2022-10-14 RX ORDER — AMLODIPINE BESYLATE 10 MG/1
TABLET ORAL
Qty: 90 TABLET | Refills: 1 | Status: SHIPPED | OUTPATIENT
Start: 2022-10-14

## 2022-10-14 RX ORDER — SERTRALINE HYDROCHLORIDE 100 MG/1
TABLET, FILM COATED ORAL
Qty: 90 TABLET | Refills: 1 | Status: SHIPPED | OUTPATIENT
Start: 2022-10-14

## 2022-10-14 RX ORDER — DOXEPIN HYDROCHLORIDE 25 MG/1
25 CAPSULE ORAL NIGHTLY
Qty: 30 CAPSULE | Refills: 0 | OUTPATIENT
Start: 2022-10-14

## 2022-11-14 RX ORDER — TRIAMTERENE AND HYDROCHLOROTHIAZIDE 75; 50 MG/1; MG/1
TABLET ORAL
Qty: 30 TABLET | Refills: 3 | Status: SHIPPED | OUTPATIENT
Start: 2022-11-14

## 2022-12-29 ENCOUNTER — OFFICE VISIT (OUTPATIENT)
Dept: FAMILY MEDICINE CLINIC | Age: 37
End: 2022-12-29
Payer: COMMERCIAL

## 2022-12-29 VITALS
RESPIRATION RATE: 14 BRPM | SYSTOLIC BLOOD PRESSURE: 124 MMHG | DIASTOLIC BLOOD PRESSURE: 84 MMHG | HEART RATE: 64 BPM | OXYGEN SATURATION: 99 % | TEMPERATURE: 98.5 F

## 2022-12-29 DIAGNOSIS — R05.1 ACUTE COUGH: Primary | ICD-10-CM

## 2022-12-29 PROCEDURE — 99213 OFFICE O/P EST LOW 20 MIN: CPT | Performed by: FAMILY MEDICINE

## 2022-12-29 PROCEDURE — 3074F SYST BP LT 130 MM HG: CPT | Performed by: FAMILY MEDICINE

## 2022-12-29 PROCEDURE — 3078F DIAST BP <80 MM HG: CPT | Performed by: FAMILY MEDICINE

## 2022-12-29 RX ORDER — PYRAZINAMIDE 500 MG/1
.5-1 TABLET ORAL EVERY 6 HOURS PRN
Qty: 20 TABLET | Refills: 0 | Status: SHIPPED | OUTPATIENT
Start: 2022-12-29 | End: 2023-01-01

## 2022-12-29 RX ORDER — ALBUTEROL SULFATE 90 UG/1
2 AEROSOL, METERED RESPIRATORY (INHALATION)
COMMUNITY

## 2022-12-29 RX ORDER — DOXYCYCLINE HYCLATE 100 MG
100 TABLET ORAL 2 TIMES DAILY
Qty: 20 TABLET | Refills: 0 | Status: SHIPPED | OUTPATIENT
Start: 2022-12-29 | End: 2023-01-08

## 2022-12-29 NOTE — LETTER
[address] [facility] [address] [facility]  Phone: [phone]  Fax: [phone]    Juan Jose Skinner MD        December 29, 2022     Patient: Tiffanie Larson   YOB: 1985   Date of Visit: 12/29/2022       To Whom It May Concern: It is my medical opinion that Tiffanie Larson should remain out of work until 1/1/23 due to an illness. If you have any questions or concerns, please don't hesitate to call.     Sincerely,        Juan Jose Skinner MD

## 2022-12-29 NOTE — PROGRESS NOTES
2022    Blood pressure 124/84, pulse 64, temperature 98.5 °F (36.9 °C), temperature source Oral, resp. rate 14, SpO2 99 %. Debbie Aleman (:  1985) is a 40 y.o. male, here for evaluation of the following medical concerns:    Chief Complaint   Patient presents with    Cough     Productive cough, sore throat, shortness of breath for 10 days     Here for illness. Onset was 10 days ago with sore throat, productive cough. No ha,   no f/c. No fatigue. He has felt GARZA the past week also- missed work past 2 days due to this. Son and wife had sore throats prior- did not have a dx. Their illness did improve, but his has not. In past 6 days sx are getting worse- maxine overnight. Up all night coughing. Rx used: otc cold/cough meds. No testing for COVID/flu    Has hx asthma:  He tried albuterol to stop coughing, did not help. But it does help GARZA slightly. Patient Active Problem List   Diagnosis    Allergic rhinitis- dust, pollen animals. Asthma    HTN (hypertension)    History of alcohol abuse- quit 2010    History of depression- treated inpatient at [facility] AT [facility]     Intracranial hemorrhage [facility])- 2013 Dr Bradshaw    Shift work sleep disorder    Keratoconus of left eye- follows with optho    Ganglion cyst of right foot    Anxiety disorder    Moderate episode of recurrent major depressive disorder ([facility] [address]    Psychophysiological insomnia        There is no height or weight on file to calculate BMI. Wt Readings from Last 3 Encounters:   22 244 lb (110.7 kg)   22 239 lb (108.4 kg)   22 238 lb (108 kg)       BP Readings from Last 3 Encounters:   22 124/84   22 132/80   22 (!) 142/98       Allergies   Allergen Reactions    Losartan Swelling     Angioedema       Prior to Visit Medications    Medication Sig Taking?  Authorizing Provider   albuterol sulfate HFA (PROVENTIL;VENTOLIN;PROAIR) 108 (90 Base) MCG/ACT inhaler Inhale 2 puffs into the lungs Yes Historical Provider, MD   triamterene-hydroCHLOROthiazide (MAXZIDE) 75-50 MG per tablet Take 1 tablet by mouth once daily Yes Braxton Burks MD   sertraline (ZOLOFT) 100 MG tablet Take 1 tablet by mouth once daily Yes Braxton Burks MD   amLODIPine (NORVASC) 10 MG tablet Take 1 tablet by mouth once daily Yes Braxton Burks MD   fluticasone-salmeterol (ADVAIR) 250-50 MCG/ACT AEPB diskus inhaler Inhale 1 puff into the lungs in the morning and 1 puff in the evening. Yes Braxton Burks MD   losartan (COZAAR) 100 MG tablet Take 1 tablet by mouth once daily  Braxton Burks MD        Social History     Tobacco Use    Smoking status: Never    Smokeless tobacco: Never   Vaping Use    Vaping Use: Never used   Substance Use Topics    Alcohol use: Yes     Comment: hx alcoholism. drinks occasionally now, but only 1/day. Drug use: Yes     Types: Marijuana Deadra Tyrese)       Review of Systems As above     Physical Exam  Vitals and nursing note reviewed. Constitutional:       General: He is not in acute distress. Appearance: Normal appearance. He is well-developed. He is not diaphoretic. HENT:      Head: Normocephalic and atraumatic. Right Ear: Tympanic membrane, ear canal and external ear normal.      Left Ear: Tympanic membrane, ear canal and external ear normal.      Nose: Congestion present. No rhinorrhea. Comments: Turbs full bilaterally but no pus and no sinus tenderness     Mouth/Throat:      Mouth: Mucous membranes are moist.      Pharynx: No oropharyngeal exudate or posterior oropharyngeal erythema. Eyes:      General: No scleral icterus. Right eye: No discharge. Left eye: No discharge. Conjunctiva/sclera: Conjunctivae normal.      Pupils: Pupils are equal, round, and reactive to light. Neck:      Thyroid: No thyromegaly. Cardiovascular:      Rate and Rhythm: Normal rate and regular rhythm. Pulses: Normal pulses.       Heart sounds: Normal heart sounds. No murmur heard. Pulmonary:      Effort: Pulmonary effort is normal. No respiratory distress. Breath sounds: Normal breath sounds. No wheezing, rhonchi or rales. Musculoskeletal:      Cervical back: Normal range of motion and neck supple. No rigidity. No muscular tenderness. Lymphadenopathy:      Cervical: No cervical adenopathy. Upper Body:      Right upper body: No supraclavicular adenopathy. Left upper body: No supraclavicular adenopathy. Skin:     General: Skin is warm and dry. Neurological:      General: No focal deficit present. Mental Status: He is alert and oriented to person, place, and time. Mental status is at baseline. Psychiatric:         Mood and Affect: Mood normal.         Behavior: Behavior normal.         Thought Content: Thought content normal.         Judgment: Judgment normal.       ASSESSMENT/PLAN:    1. Acute cough  - exam is unremarkable, as are vitals. But is significantly symptomatic after 10 days of URI symptoms. In lieu of imaging, will treat with doxy and a cough medicine and defer further eval if he does not improve after a few days. Advised to rest and hydrate well. Call or f/u if not improving by next week. An  Vaultizeignature was used to authenticate this note.     --Bry Garcia MD on 12/29/2022 at 4:19 PM

## 2023-01-30 ENCOUNTER — OFFICE VISIT (OUTPATIENT)
Dept: FAMILY MEDICINE CLINIC | Age: 38
End: 2023-01-30
Payer: COMMERCIAL

## 2023-01-30 VITALS
RESPIRATION RATE: 10 BRPM | DIASTOLIC BLOOD PRESSURE: 80 MMHG | OXYGEN SATURATION: 96 % | WEIGHT: 239 LBS | HEART RATE: 72 BPM | SYSTOLIC BLOOD PRESSURE: 120 MMHG | BODY MASS INDEX: 35.29 KG/M2

## 2023-01-30 DIAGNOSIS — M65.4 DE QUERVAIN'S DISEASE (TENOSYNOVITIS): Primary | ICD-10-CM

## 2023-01-30 PROCEDURE — 3078F DIAST BP <80 MM HG: CPT | Performed by: FAMILY MEDICINE

## 2023-01-30 PROCEDURE — 99213 OFFICE O/P EST LOW 20 MIN: CPT | Performed by: FAMILY MEDICINE

## 2023-01-30 PROCEDURE — 3074F SYST BP LT 130 MM HG: CPT | Performed by: FAMILY MEDICINE

## 2023-01-30 ASSESSMENT — PATIENT HEALTH QUESTIONNAIRE - PHQ9
SUM OF ALL RESPONSES TO PHQ QUESTIONS 1-9: 2
8. MOVING OR SPEAKING SO SLOWLY THAT OTHER PEOPLE COULD HAVE NOTICED. OR THE OPPOSITE, BEING SO FIGETY OR RESTLESS THAT YOU HAVE BEEN MOVING AROUND A LOT MORE THAN USUAL: 0
SUM OF ALL RESPONSES TO PHQ9 QUESTIONS 1 & 2: 0
3. TROUBLE FALLING OR STAYING ASLEEP: 2
SUM OF ALL RESPONSES TO PHQ QUESTIONS 1-9: 2
5. POOR APPETITE OR OVEREATING: 0
2. FEELING DOWN, DEPRESSED OR HOPELESS: 0
6. FEELING BAD ABOUT YOURSELF - OR THAT YOU ARE A FAILURE OR HAVE LET YOURSELF OR YOUR FAMILY DOWN: 0
1. LITTLE INTEREST OR PLEASURE IN DOING THINGS: 0
7. TROUBLE CONCENTRATING ON THINGS, SUCH AS READING THE NEWSPAPER OR WATCHING TELEVISION: 0
4. FEELING TIRED OR HAVING LITTLE ENERGY: 0
SUM OF ALL RESPONSES TO PHQ QUESTIONS 1-9: 2
SUM OF ALL RESPONSES TO PHQ QUESTIONS 1-9: 2
10. IF YOU CHECKED OFF ANY PROBLEMS, HOW DIFFICULT HAVE THESE PROBLEMS MADE IT FOR YOU TO DO YOUR WORK, TAKE CARE OF THINGS AT HOME, OR GET ALONG WITH OTHER PEOPLE: 0
9. THOUGHTS THAT YOU WOULD BE BETTER OFF DEAD, OR OF HURTING YOURSELF: 0

## 2023-01-30 NOTE — PROGRESS NOTES
2023    Blood pressure (!) 140/92, pulse 72, resp. rate 10, weight 239 lb (108.4 kg), SpO2 96 %. Adela Cowart (:  1985) is a 40 y.o. male, here for evaluation of the following medical concerns:    Chief Complaint   Patient presents with    Wrist Pain     Lt wrist pain, hurts when moving, for 1 week     Here for concern of left wrist pain. Onset about a week ago. Pain with motion, supinations, lifting. No injury  He has been lifting weights and using punching bag. No cuts/bruises or bumps. Cat tingle sometimes over dorsal wrist.  No numbness. Can radiate to hand. Feels best to be in neurtral position and not moving. Meds used: tylenol #3 from old Rx. Or plain tylenol. Last renal function test:   Lab Results   Component Value Date/Time     2022 11:55 AM    K 3.3 2022 11:55 AM    K 3.1 10/05/2019 02:16 PM    BUN 27 2022 11:55 AM    CREATININE 1.3 2022 11:55 AM     CrCl cannot be calculated (Patient's most recent lab result is older than the maximum 180 days allowed. ). He is still taking norvasc and maxzide     Patient Active Problem List   Diagnosis    Allergic rhinitis- dust, pollen animals. Asthma    HTN (hypertension)    History of alcohol abuse- quit     History of depression- treated inpatient at [facility] AT [facility]     Intracranial hemorrhage [facility])-  Dr Bradshaw    Shift work sleep disorder    Keratoconus of left eye- follows with optho    Ganglion cyst of right foot    Anxiety disorder    Moderate episode of recurrent major depressive disorder (HCC)    Psychophysiological insomnia        Body mass index is 35.29 kg/m².     Wt Readings from Last 3 Encounters:   23 239 lb (108.4 kg)   22 244 lb (110.7 kg)   22 239 lb (108.4 kg)       BP Readings from Last 3 Encounters:   23 (!) 140/92   22 124/84   22 132/80       Allergies   Allergen Reactions    Losartan Swelling     Angioedema       Prior to Visit Medications    Medication Sig Taking? Authorizing Provider   albuterol sulfate HFA (PROVENTIL;VENTOLIN;PROAIR) 108 (90 Base) MCG/ACT inhaler Inhale 2 puffs into the lungs Yes Historical Provider, MD   triamterene-hydroCHLOROthiazide (MAXZIDE) 75-50 MG per tablet Take 1 tablet by mouth once daily Yes Katina Rudolph MD   sertraline (ZOLOFT) 100 MG tablet Take 1 tablet by mouth once daily Yes Katina Rudolph MD   amLODIPine (NORVASC) 10 MG tablet Take 1 tablet by mouth once daily Yes Katina Rudolph MD   fluticasone-salmeterol (ADVAIR) 250-50 MCG/ACT AEPB diskus inhaler Inhale 1 puff into the lungs in the morning and 1 puff in the evening. Yes Katina Rudolph MD   losartan (COZAAR) 100 MG tablet Take 1 tablet by mouth once daily  Katina Rudolph MD        Social History     Tobacco Use    Smoking status: Never    Smokeless tobacco: Never   Vaping Use    Vaping Use: Never used   Substance Use Topics    Alcohol use: Yes     Comment: hx alcoholism. drinks occasionally now, but only 1/day. Drug use: Yes     Types: Marijuana Leandrodaniela Tay)       Review of Systems    Physical Exam  Constitutional:       General: He is not in acute distress. Appearance: Normal appearance. He is not ill-appearing. HENT:      Head: Normocephalic and atraumatic. Pulmonary:      Effort: Pulmonary effort is normal.   Musculoskeletal:         General: Normal range of motion. Cervical back: Normal range of motion. Comments: FROM wrist. No deformity. Acute tenderness over radial styloid and proxmilally along soft tissues. + finklestein's. Skin:     Findings: No rash. Neurological:      General: No focal deficit present. Mental Status: He is alert and oriented to person, place, and time. Mental status is at baseline. Psychiatric:         Mood and Affect: Mood normal.         Behavior: Behavior normal.         Thought Content:  Thought content normal.         Judgment: Judgment normal. ASSESSMENT/PLAN:    1. De Quervain's disease (tenosynovitis)  - discussed  treatment plan for this: relative rest, icing, topical nsaids and oral tylenol, night splinting (thumb spica) and when feasible during the day. Also advised gentle ROM exercises and stretching for wrist.    Call for PT referral if not improving significantly in a couple weeks. F/U PRN for this if worsening/not improving. An  Storypandaignature was used to authenticate this note.     --Kate Kaur MD on 1/30/2023 at 1:49 PM

## 2023-02-07 RX ORDER — TRIAMTERENE AND HYDROCHLOROTHIAZIDE 75; 50 MG/1; MG/1
TABLET ORAL
Qty: 30 TABLET | Refills: 5 | Status: SHIPPED | OUTPATIENT
Start: 2023-02-07

## 2023-03-28 ENCOUNTER — OFFICE VISIT (OUTPATIENT)
Dept: FAMILY MEDICINE CLINIC | Age: 38
End: 2023-03-28
Payer: COMMERCIAL

## 2023-03-28 VITALS
DIASTOLIC BLOOD PRESSURE: 92 MMHG | SYSTOLIC BLOOD PRESSURE: 134 MMHG | HEART RATE: 82 BPM | OXYGEN SATURATION: 98 % | BODY MASS INDEX: 35.15 KG/M2 | WEIGHT: 238 LBS | RESPIRATION RATE: 14 BRPM

## 2023-03-28 DIAGNOSIS — I62.9 INTRACRANIAL HEMORRHAGE (HCC): ICD-10-CM

## 2023-03-28 DIAGNOSIS — I10 PRIMARY HYPERTENSION: Primary | ICD-10-CM

## 2023-03-28 DIAGNOSIS — F51.04 PSYCHOPHYSIOLOGICAL INSOMNIA: ICD-10-CM

## 2023-03-28 DIAGNOSIS — F33.1 MODERATE EPISODE OF RECURRENT MAJOR DEPRESSIVE DISORDER (HCC): ICD-10-CM

## 2023-03-28 DIAGNOSIS — I10 PRIMARY HYPERTENSION: ICD-10-CM

## 2023-03-28 DIAGNOSIS — J45.30 MILD PERSISTENT ASTHMA WITHOUT COMPLICATION: ICD-10-CM

## 2023-03-28 LAB
ALBUMIN SERPL-MCNC: 4.4 G/DL (ref 3.4–5)
ALBUMIN/GLOB SERPL: 1.6 {RATIO} (ref 1.1–2.2)
ALP SERPL-CCNC: 73 U/L (ref 40–129)
ALT SERPL-CCNC: 10 U/L (ref 10–40)
ANION GAP SERPL CALCULATED.3IONS-SCNC: 13 MMOL/L (ref 3–16)
AST SERPL-CCNC: 21 U/L (ref 15–37)
BILIRUB SERPL-MCNC: <0.2 MG/DL (ref 0–1)
BUN SERPL-MCNC: 17 MG/DL (ref 7–20)
CALCIUM SERPL-MCNC: 9.6 MG/DL (ref 8.3–10.6)
CHLORIDE SERPL-SCNC: 99 MMOL/L (ref 99–110)
CO2 SERPL-SCNC: 29 MMOL/L (ref 21–32)
CREAT SERPL-MCNC: 1.2 MG/DL (ref 0.9–1.3)
GFR SERPLBLD CREATININE-BSD FMLA CKD-EPI: >60 ML/MIN/{1.73_M2}
GLUCOSE SERPL-MCNC: 92 MG/DL (ref 70–99)
POTASSIUM SERPL-SCNC: 3.7 MMOL/L (ref 3.5–5.1)
PROT SERPL-MCNC: 7.2 G/DL (ref 6.4–8.2)
SODIUM SERPL-SCNC: 141 MMOL/L (ref 136–145)

## 2023-03-28 PROCEDURE — 3078F DIAST BP <80 MM HG: CPT | Performed by: FAMILY MEDICINE

## 2023-03-28 PROCEDURE — 99214 OFFICE O/P EST MOD 30 MIN: CPT | Performed by: FAMILY MEDICINE

## 2023-03-28 PROCEDURE — 3074F SYST BP LT 130 MM HG: CPT | Performed by: FAMILY MEDICINE

## 2023-03-28 SDOH — ECONOMIC STABILITY: TRANSPORTATION INSECURITY
IN THE PAST 12 MONTHS, HAS LACK OF TRANSPORTATION KEPT YOU FROM MEETINGS, WORK, OR FROM GETTING THINGS NEEDED FOR DAILY LIVING?: NO

## 2023-03-28 SDOH — ECONOMIC STABILITY: HOUSING INSECURITY
IN THE LAST 12 MONTHS, WAS THERE A TIME WHEN YOU DID NOT HAVE A STEADY PLACE TO SLEEP OR SLEPT IN A SHELTER (INCLUDING NOW)?: NO

## 2023-03-28 SDOH — ECONOMIC STABILITY: INCOME INSECURITY: HOW HARD IS IT FOR YOU TO PAY FOR THE VERY BASICS LIKE FOOD, HOUSING, MEDICAL CARE, AND HEATING?: NOT VERY HARD

## 2023-03-28 SDOH — ECONOMIC STABILITY: FOOD INSECURITY: WITHIN THE PAST 12 MONTHS, THE FOOD YOU BOUGHT JUST DIDN'T LAST AND YOU DIDN'T HAVE MONEY TO GET MORE.: NEVER TRUE

## 2023-03-28 SDOH — ECONOMIC STABILITY: FOOD INSECURITY: WITHIN THE PAST 12 MONTHS, YOU WORRIED THAT YOUR FOOD WOULD RUN OUT BEFORE YOU GOT MONEY TO BUY MORE.: NEVER TRUE

## 2023-03-28 NOTE — PATIENT INSTRUCTIONS
[facility] - [place] [address] [place] [address]

## 2023-03-28 NOTE — PROGRESS NOTES
electronicsignature was used to authenticate this note.     --Robyn Uriostegui MD on 3/28/2023 at 11:25 AM

## 2023-03-30 RX ORDER — FLUTICASONE PROPIONATE AND SALMETEROL 250; 50 UG/1; UG/1
POWDER RESPIRATORY (INHALATION)
Qty: 60 EACH | Refills: 5 | Status: SHIPPED | OUTPATIENT
Start: 2023-03-30

## 2023-04-19 RX ORDER — AMLODIPINE BESYLATE 10 MG/1
TABLET ORAL
Qty: 90 TABLET | Refills: 1 | Status: SHIPPED | OUTPATIENT
Start: 2023-04-19

## 2023-05-16 RX ORDER — SERTRALINE HYDROCHLORIDE 100 MG/1
TABLET, FILM COATED ORAL
Qty: 90 TABLET | Refills: 1 | Status: SHIPPED | OUTPATIENT
Start: 2023-05-16

## 2023-05-25 ENCOUNTER — OFFICE VISIT (OUTPATIENT)
Dept: FAMILY MEDICINE CLINIC | Age: 38
End: 2023-05-25
Payer: COMMERCIAL

## 2023-05-25 VITALS
RESPIRATION RATE: 16 BRPM | BODY MASS INDEX: 35.4 KG/M2 | HEIGHT: 69 IN | WEIGHT: 239 LBS | DIASTOLIC BLOOD PRESSURE: 86 MMHG | SYSTOLIC BLOOD PRESSURE: 128 MMHG | OXYGEN SATURATION: 99 % | HEART RATE: 69 BPM

## 2023-05-25 DIAGNOSIS — I10 PRIMARY HYPERTENSION: ICD-10-CM

## 2023-05-25 DIAGNOSIS — F51.04 PSYCHOPHYSIOLOGICAL INSOMNIA: ICD-10-CM

## 2023-05-25 DIAGNOSIS — F33.1 MODERATE EPISODE OF RECURRENT MAJOR DEPRESSIVE DISORDER (HCC): Primary | ICD-10-CM

## 2023-05-25 PROCEDURE — 3078F DIAST BP <80 MM HG: CPT | Performed by: FAMILY MEDICINE

## 2023-05-25 PROCEDURE — 3074F SYST BP LT 130 MM HG: CPT | Performed by: FAMILY MEDICINE

## 2023-05-25 PROCEDURE — 99214 OFFICE O/P EST MOD 30 MIN: CPT | Performed by: FAMILY MEDICINE

## 2023-05-25 NOTE — PATIENT INSTRUCTIONS
[facility] - [place] [address] [place] [address]   Phone:  [phone]  Fax:  [phone]

## 2023-05-25 NOTE — PROGRESS NOTES
2023    Blood pressure 128/86, pulse 69, resp. rate 16, height 5' 9\" (1.753 m), weight 239 lb (108.4 kg), SpO2 99 %. Crissy Zambrano (:  1985) is a 41 y.o. male, here for evaluation of the following medical concerns:    Chief Complaint   Patient presents with    Hypertension     Pt has a lot of stress at the moment      Here for routine follow up of HTN. He is stressed because wife has been ill. May have lupus. Migraines. A seizure. Has 3 kids, ages 12, 15, 6. He is taking Zoloft 100 mg mainly for depression. Some days he is overwhelmed, feeling alone. Does not want to discuss his worries with his worries. He has talked to his  in past and plans to set up an appt. Alcohol use is 'occasional' without overuse. Sleep is poor. Hard to fall asleep. Awakens at night also. Zoloft did never really help sleep. He is active at work- 20,000 steps a day. HTN: on Norvasc and maxzide. Not testing at home. Last renal function test:   Lab Results   Component Value Date/Time     2023 12:03 PM    K 3.7 2023 12:03 PM    K 3.1 10/05/2019 02:16 PM    BUN 17 2023 12:03 PM    CREATININE 1.2 2023 12:03 PM     Estimated Creatinine Clearance: 102 mL/min (based on SCr of 1.2 mg/dL). No chest pains, dizziness, heart palpitations, dyspnea, lightheadedness, worsening edema. Asthma has been good. Takes Advair daily. Patient Active Problem List   Diagnosis    Allergic rhinitis- dust, pollen animals. Asthma    HTN (hypertension)    History of alcohol abuse- quit 2010    History of depression- treated inpatient at [facility] AT [facility] 2008    Intracranial hemorrhage [facility])-  Dr Bradshaw    Shift work sleep disorder    Keratoconus of left eye- follows with optho    Ganglion cyst of right foot    Anxiety disorder    Moderate episode of recurrent major depressive disorder (HCC)    Psychophysiological insomnia        Body mass index is 35.29 kg/m².     Wt Readings from

## 2023-06-20 NOTE — TELEPHONE ENCOUNTER
From: Yudith Schulte  To: Dr. Brigida Cosme: 8/11/2022 1:09 AM EDT  Subject: Margarette Larry, My asthma has been acting up for 4-5 days its getting progressively worse. I was hoping I could get a prescription sent in for some prednisone.
Please advise
Please advise
Pt was last seen on 06/02 but not for this issue. Would you like him to be seen today?
No

## 2023-09-25 RX ORDER — TRIAMTERENE AND HYDROCHLOROTHIAZIDE 75; 50 MG/1; MG/1
TABLET ORAL
Qty: 30 TABLET | Refills: 1 | Status: SHIPPED | OUTPATIENT
Start: 2023-09-25 | End: 2023-11-02

## 2023-09-25 NOTE — TELEPHONE ENCOUNTER
Due for follow up with Dr Nav Fine- please call them to schedule at their earliest convenience. (meds have been refilled this time)

## 2023-10-03 ENCOUNTER — OFFICE VISIT (OUTPATIENT)
Dept: FAMILY MEDICINE CLINIC | Age: 38
End: 2023-10-03
Payer: COMMERCIAL

## 2023-10-03 VITALS
WEIGHT: 237.4 LBS | DIASTOLIC BLOOD PRESSURE: 96 MMHG | SYSTOLIC BLOOD PRESSURE: 130 MMHG | RESPIRATION RATE: 18 BRPM | TEMPERATURE: 98.5 F | HEIGHT: 69 IN | OXYGEN SATURATION: 97 % | HEART RATE: 77 BPM | BODY MASS INDEX: 35.16 KG/M2

## 2023-10-03 DIAGNOSIS — M25.561 CHRONIC PAIN OF RIGHT KNEE: ICD-10-CM

## 2023-10-03 DIAGNOSIS — I62.9 INTRACRANIAL HEMORRHAGE (HCC): ICD-10-CM

## 2023-10-03 DIAGNOSIS — I10 PRIMARY HYPERTENSION: Primary | ICD-10-CM

## 2023-10-03 DIAGNOSIS — J45.30 MILD PERSISTENT ASTHMA WITHOUT COMPLICATION: ICD-10-CM

## 2023-10-03 DIAGNOSIS — G89.29 CHRONIC PAIN OF RIGHT KNEE: ICD-10-CM

## 2023-10-03 DIAGNOSIS — F33.1 MODERATE EPISODE OF RECURRENT MAJOR DEPRESSIVE DISORDER (HCC): ICD-10-CM

## 2023-10-03 PROCEDURE — 99214 OFFICE O/P EST MOD 30 MIN: CPT | Performed by: FAMILY MEDICINE

## 2023-10-03 PROCEDURE — 90471 IMMUNIZATION ADMIN: CPT | Performed by: FAMILY MEDICINE

## 2023-10-03 PROCEDURE — 3074F SYST BP LT 130 MM HG: CPT | Performed by: FAMILY MEDICINE

## 2023-10-03 PROCEDURE — 3078F DIAST BP <80 MM HG: CPT | Performed by: FAMILY MEDICINE

## 2023-10-03 PROCEDURE — 90674 CCIIV4 VAC NO PRSV 0.5 ML IM: CPT | Performed by: FAMILY MEDICINE

## 2023-10-03 RX ORDER — CLONIDINE HYDROCHLORIDE 0.1 MG/1
0.1 TABLET ORAL 2 TIMES DAILY
Qty: 60 TABLET | Refills: 3 | Status: SHIPPED | OUTPATIENT
Start: 2023-10-03

## 2023-10-03 NOTE — PROGRESS NOTES
History     Tobacco Use    Smoking status: Never    Smokeless tobacco: Never   Vaping Use    Vaping Use: Never used   Substance Use Topics    Alcohol use: Yes     Comment: hx alcoholism. drinks occasionally now, but only 1/day. Drug use: Yes     Types: Marijuana Santiago Bravo)       Review of Systems No chest pains, dizziness, heart palpitations, dyspnea, lightheadedness, worsening edema. Physical Exam  Vitals and nursing note reviewed. Constitutional:       General: He is not in acute distress. Appearance: Normal appearance. He is well-developed. He is not diaphoretic. Cardiovascular:      Rate and Rhythm: Normal rate and regular rhythm. Pulses: Normal pulses. Heart sounds: Normal heart sounds. No murmur heard. Pulmonary:      Effort: Pulmonary effort is normal. No respiratory distress. Breath sounds: Normal breath sounds. No wheezing or rales. Musculoskeletal:      Cervical back: Normal range of motion. Right lower leg: No edema. Left lower leg: No edema. Comments: Knee:no deformity. + crepitous with AROM. No effusion or bony tenderness. Mild lateral JLT   Skin:     General: Skin is warm and dry. Neurological:      General: No focal deficit present. Mental Status: He is alert and oriented to person, place, and time. Mental status is at baseline. Psychiatric:         Mood and Affect: Mood normal.         Behavior: Behavior normal.         Thought Content: Thought content normal.         Judgment: Judgment normal.         ASSESSMENT/PLAN:    1. Primary hypertension  - repeat bp 130/96. He does not test at home. Add clonidine 0.1 mg hx for sleep, then bid after 2 wks (to hopefully help both bp and sleep). Consider sleep study    2. Moderate episode of recurrent major depressive disorder (HCC)  - Stable; continue Zoloft. 3. Mild persistent asthma without complication  - Stable; continue adviar.      4. Intracranial hemorrhage ([address])- 2013 Dr Bradshaw  - mild residual

## 2023-10-03 NOTE — PATIENT INSTRUCTIONS
Clonidine: 0.1 mg only at bedtime for 2 wks. Then take twice daily after that.     If you need a higher dose for sleep, then we can have you take 0.2 at bedtime and 0.1 mg in AM.

## 2023-10-10 ENCOUNTER — HOSPITAL ENCOUNTER (OUTPATIENT)
Dept: PHYSICAL THERAPY | Age: 38
Setting detail: THERAPIES SERIES
Discharge: HOME | End: 2023-10-10
Attending: FAMILY MEDICINE
Payer: COMMERCIAL

## 2023-10-10 DIAGNOSIS — M62.81 DECREASED MUSCLE STRENGTH: ICD-10-CM

## 2023-10-10 DIAGNOSIS — G89.29 CHRONIC PAIN OF RIGHT KNEE: Primary | ICD-10-CM

## 2023-10-10 DIAGNOSIS — M25.561 CHRONIC PAIN OF RIGHT KNEE: Primary | ICD-10-CM

## 2023-10-10 PROCEDURE — 97530 THERAPEUTIC ACTIVITIES: CPT

## 2023-10-10 PROCEDURE — 97161 PT EVAL LOW COMPLEX 20 MIN: CPT

## 2023-10-10 PROCEDURE — 97110 THERAPEUTIC EXERCISES: CPT

## 2023-10-10 NOTE — FLOWSHEET NOTE
[id number] [facility], [address] office: [phone] fax: [phone]      Physical Therapy: TREATMENT/PROGRESS NOTE   Patient: Aniyah Mcmahon (84 y.o. male)   Treatment Date: 10/10/2023   :  1985 MRN: [id number]   Visit #: 1   Insurance Allowable Auth Needed   20 []Yes    []No    Insurance: Payor: Yolanda Rick / Plan: [place] / Product Type: *No Product type* /   Insurance ID: [id number] - ([facility])  Secondary Insurance (if applicable):    Treatment Diagnosis:     ICD-10-CM    1. Chronic pain of right knee  M25.561     G89.29       2.  Decreased muscle strength  M62.81          Medical Diagnosis:    Chronic pain of right knee [K78.873, G89.29]   Referring Physician: Wes Thorne, *  PCP: Wes Thorne MD                             Plan of care signed (Y/N):     Date of Patient follow up with Physician:      Progress Report/POC: EVAL today  POC update due: 2023 (OR 10 visits /OR [address], whichever is less)    Latex Allergy:  [x]NO      []YES    Preferred Language for Healthcare:   [x]English       []other:    SUBJECTIVE EXAMINATION     Patient Report/Comments: see eval    OBJECTIVE EXAMINATION     Observation:     Test measurements: see eval     Test used Initial score  10/10/23 10/10/2023   Pain Summary VAS 5    Functional questionnaire WOMAC 24    Other:              RESTRICTIONS/PRECAUTIONS:     Exercises/Interventions:     Therapeutic Ex (18344)  resistance Sets/time Reps Notes/Cues/Progressions                                                                         Manual Intervention ([id number])  TIME                                        NMR re-education (58530) resistance Sets/time Reps CUES NEEDED                                      Therapeutic Activity (19872)  Sets/time                   HEP       SLR FWD and Side  HS Stretch  Single leg bridge    10/10/23            Modalities:

## 2023-10-17 ENCOUNTER — HOSPITAL ENCOUNTER (OUTPATIENT)
Dept: PHYSICAL THERAPY | Age: 38
Setting detail: THERAPIES SERIES
Discharge: HOME | End: 2023-10-17
Attending: FAMILY MEDICINE
Payer: COMMERCIAL

## 2023-10-17 PROCEDURE — 97110 THERAPEUTIC EXERCISES: CPT

## 2023-10-17 NOTE — FLOWSHEET NOTE
[address] [facility], [address], [address] [place] [address] office: [phone] fax: [phone]      Physical Therapy: TREATMENT/PROGRESS NOTE   Patient: Avtar Yin (72 y.o. male)   Treatment Date: 10/17/2023   :  1985 MRN: [id number]   Visit #: 2   Insurance Allowable Auth Needed   20 []Yes    []No    Insurance: Payor: Darwin Liu / Plan: Liudmila Bio / Product Type: *No Product type* /   Insurance ID: [id number] - ([place] [employer])  Secondary Insurance (if applicable):    Treatment Diagnosis:   No diagnosis found. Medical Diagnosis:    Chronic pain of right knee [Y02.399, G89.29]   Referring Physician: Yumiko Marie, *  PCP: Yumiko Marie MD                             Plan of care signed (Y/N):     Date of Patient follow up with Physician:      Progress Report/POC: EVAL today  POC update due: 2023 (OR 10 visits /OR [address], whichever is less)    Latex Allergy:  [x]NO      []YES    Preferred Language for Healthcare:   [x]English       []other:    SUBJECTIVE EXAMINATION     Patient Report/Comments:     10/17/23: Pt states he did his HEP 1x  a day. Was able to go up from 10 reps to 15 reps for the SLR.  Pain levels about normal today    OBJECTIVE EXAMINATION     Observation:     Test measurements: see eval     Test used Initial score  10/10/23 10/17/2023   Pain Summary VAS 5 5   Functional questionnaire WOMAC 24    Other:              RESTRICTIONS/PRECAUTIONS:     Exercises/Interventions:     Therapeutic Ex (63313)  resistance Sets/time Reps Notes/Cues/Progressions   Bike Level 8 5 min     SLR-Fwd            Side  1  2  2  1 15  10  15  10    LAQ 10# 2 20    Leg Press 90# 3 20 Last few reps pt felt   Standing-   Hip Abduction  Hip Extension  Hip Abduction walk with green tband      2  2     10  10  3 laps   B  R                                      Manual Intervention (63846)  TIME

## 2023-10-22 ENCOUNTER — HOSPITAL ENCOUNTER (EMERGENCY)
Age: 38
Discharge: HOME | End: 2023-10-22
Payer: COMMERCIAL

## 2023-10-22 VITALS
RESPIRATION RATE: 21 BRPM | DIASTOLIC BLOOD PRESSURE: 96 MMHG | TEMPERATURE: 97.7 F | SYSTOLIC BLOOD PRESSURE: 148 MMHG | HEART RATE: 81 BPM | OXYGEN SATURATION: 100 % | HEIGHT: 69 IN | WEIGHT: 238.98 LBS | BODY MASS INDEX: 35.4 KG/M2

## 2023-10-22 DIAGNOSIS — S05.02XA CORNEAL ABRASION, LEFT, INITIAL ENCOUNTER: Primary | ICD-10-CM

## 2023-10-22 PROCEDURE — 99283 EMERGENCY DEPT VISIT LOW MDM: CPT

## 2023-10-22 PROCEDURE — 6370000000 HC RX 637 (ALT 250 FOR IP): Performed by: PHYSICIAN ASSISTANT

## 2023-10-22 RX ORDER — ERYTHROMYCIN 5 MG/G
OINTMENT OPHTHALMIC
Qty: 3.5 G | Refills: 0 | Status: SHIPPED | OUTPATIENT
Start: 2023-10-22

## 2023-10-22 RX ORDER — TETRACAINE HYDROCHLORIDE 5 MG/ML
2 SOLUTION OPHTHALMIC ONCE
Status: COMPLETED | OUTPATIENT
Start: 2023-10-22 | End: 2023-10-22

## 2023-10-22 RX ADMIN — FLUORESCEIN SODIUM 1 MG: 1 STRIP OPHTHALMIC at 13:17

## 2023-10-22 RX ADMIN — TETRACAINE HYDROCHLORIDE 2 DROP: 5 SOLUTION OPHTHALMIC at 13:16

## 2023-10-22 ASSESSMENT — PAIN SCALES - GENERAL
PAINLEVEL_OUTOF10: 0
PAINLEVEL_OUTOF10: 8

## 2023-10-22 ASSESSMENT — PAIN DESCRIPTION - ONSET: ONSET: PROGRESSIVE

## 2023-10-22 ASSESSMENT — PAIN - FUNCTIONAL ASSESSMENT
PAIN_FUNCTIONAL_ASSESSMENT: 0-10
PAIN_FUNCTIONAL_ASSESSMENT: PREVENTS OR INTERFERES WITH ALL ACTIVE AND SOME PASSIVE ACTIVITIES
PAIN_FUNCTIONAL_ASSESSMENT: 0-10

## 2023-10-22 ASSESSMENT — PAIN DESCRIPTION - FREQUENCY: FREQUENCY: CONTINUOUS

## 2023-10-22 ASSESSMENT — PAIN DESCRIPTION - PAIN TYPE: TYPE: ACUTE PAIN

## 2023-10-22 ASSESSMENT — PAIN DESCRIPTION - LOCATION: LOCATION: EYE

## 2023-10-22 ASSESSMENT — PAIN DESCRIPTION - ORIENTATION: ORIENTATION: RIGHT

## 2023-10-22 ASSESSMENT — PAIN DESCRIPTION - DESCRIPTORS: DESCRIPTORS: DISCOMFORT

## 2023-10-22 NOTE — ED NOTES
D/C: Order noted for d/c. Pt confirmed d/c paperwork have correct name. Discharge and education instructions reviewed with patient. Teach-back successful. Pt verbalized understanding and signed d/c papers. Pt denied questions at this time. No acute distress noted. Patient instructed to follow-up as noted - return to emergency department if symptoms worsen. Patient verbalized understanding. Discharged per EDMD with discharge instructions. Pt discharged to private vehicle. Patient stable upon departure. Thanked patient for choosing [facility] for care. Provider aware of patient pain at time of discharge.        Apolonia Lundberg RN  10/22/23 0909

## 2023-10-24 ENCOUNTER — HOSPITAL ENCOUNTER (OUTPATIENT)
Dept: PHYSICAL THERAPY | Age: 38
Setting detail: THERAPIES SERIES
Discharge: HOME | End: 2023-10-24
Attending: FAMILY MEDICINE
Payer: COMMERCIAL

## 2023-10-24 PROCEDURE — 97140 MANUAL THERAPY 1/> REGIONS: CPT

## 2023-10-24 PROCEDURE — 97110 THERAPEUTIC EXERCISES: CPT

## 2023-10-24 NOTE — FLOWSHEET NOTE
[id number] [facility], [address] office: [phone] fax: [phone]      Physical Therapy: TREATMENT/PROGRESS NOTE   Patient: Sharri Abbasi (18 y.o. male)   Treatment Date: 10/24/2023   :  1985 MRN: [id number]   Visit #: 3   Insurance Allowable Auth Needed   20 []Yes    [x]No    Insurance: Payor: [facility] [place] / Plan: Donnal Pickerel / Product Type: *No Product type* /   Insurance ID: [id number] - ([facility])  Secondary Insurance (if applicable):    Treatment Diagnosis:   No diagnosis found. Medical Diagnosis:    Chronic pain of right knee [K71.360, G89.29]   Referring Physician: Lane Roe, *  PCP: Lane Roe MD                             Plan of care signed (Y/N):     Date of Patient follow up with Physician:      Progress Report/POC: EVAL today  POC update due: 2023 (OR 10 visits /OR Nasir Queen, whichever is less)    Latex Allergy:  [x]NO      []YES    Preferred Language for Healthcare:   [x]English       []other:    SUBJECTIVE EXAMINATION     Patient Report/Comments:   Pt states he is having R knee pain. Started about 2.5 years ago, MRI showed chondral defect in R knee. It was locking and spasming initially but now it is just painful, hasn't had the locking or spasms. Pain is 5/10 and is pretty constant, especially after working all day. Minimal pain in the AM. States going down steps, he feels it the most. States his job requires standing/walking but optional squatting/bending. It's a physical job. Used to lift heavy weights but doesn't do any exercise anymore. Pain is in the front of his knee around the knee cap on the inside left knee. Gets popping when he bends down to squat. 10/17/23: Pt states he did his HEP 1x  a day. Was able to go up from 10 reps to 15 reps for the SLR. Pain levels about normal today  10/24/23: States his knee is doing well, no pain really.   HEP is

## 2023-10-31 ENCOUNTER — HOSPITAL ENCOUNTER (OUTPATIENT)
Dept: PHYSICAL THERAPY | Age: 38
Setting detail: THERAPIES SERIES
Discharge: HOME | End: 2023-10-31
Attending: FAMILY MEDICINE
Payer: COMMERCIAL

## 2023-10-31 NOTE — FLOWSHEET NOTE
[address] [facility].  [address] [place] [address], [address] [place] [address]  Phone: [phone]   Fax:     [phone]    Physical Therapy  Cancellation/No-show Note  Patient Name:  Alessandra Dalton  :  1985   Date:  10/31/2023  Cancelled visits to date: 1  No-shows to date: 0    Patient status for today's appointment patient:  [x]  Cancelled  []  Rescheduled appointment  []  No-show     Reason given by patient:  []  Patient ill  []  Conflicting appointment  []  No transportation    []  Conflict with work  [x]  No reason given  []  Other:     Comments:      Phone call information:   []  Phone call made today to patient at _ time at number provided:      []  Patient answered, conversation as follows:    []  Patient did not answer, message left as follows:  [x]  Phone call not made today    Electronically signed by:  Ochoa Monson, PT

## 2023-11-02 RX ORDER — AMLODIPINE BESYLATE 10 MG/1
TABLET ORAL
Qty: 90 TABLET | Refills: 1 | Status: SHIPPED | OUTPATIENT
Start: 2023-11-02

## 2023-11-02 RX ORDER — SERTRALINE HYDROCHLORIDE 100 MG/1
TABLET, FILM COATED ORAL
Qty: 90 TABLET | Refills: 1 | Status: SHIPPED | OUTPATIENT
Start: 2023-11-02

## 2023-11-02 RX ORDER — TRIAMTERENE AND HYDROCHLOROTHIAZIDE 75; 50 MG/1; MG/1
TABLET ORAL
Qty: 90 TABLET | Refills: 1 | Status: SHIPPED | OUTPATIENT
Start: 2023-11-02

## 2023-11-02 RX ORDER — SERTRALINE HYDROCHLORIDE 100 MG/1
TABLET, FILM COATED ORAL
Qty: 90 TABLET | Refills: 0 | OUTPATIENT
Start: 2023-11-02

## 2023-11-06 NOTE — FLOWSHEET NOTE
Status: [x] Progressing: [] Met: [] Not Met: [] Adjusted      Overall Progression Towards Functional goals/ Treatment Progress Update:  [x] Patient is progressing as expected towards functional goals listed. [] Progression is slowed due to complexities/Impairments listed. [] Progression has been slowed due to co-morbidities. [] Plan just implemented, too soon (<30days) to assess goals progression   [] Goals require adjustment due to lack of progress  [] Patient is not progressing as expected and requires additional follow up with physician  [] Other:     CHARGE CAPTURE     CHARGE GRID   CPT Code (TIMED) minutes # CPT Code (UNTIMED) #     [x] Therex (01726)  40 3  [] EVAL:LOW (85693 - Typically 20 minutes face-to-face)     [] Neuromusc. Re-ed (72420)    [] Re-Eval (21327)     [] Manual ([id number])    [] Estim Unattended (70373)     [] Ther. Act (28046)    [] [place]. Traction (38067)     [] Gait (97205)    [] Dry Needle 1-2 muscle (35008)     [] Aquatic Therex (43101)    [] Dry Needle 3+ muscle (93971)     [] Iontophoresis (11409)    [] VASO (35640)     [] Ultrasound (84926)    [] Group Therapy (15445)     [] Estim Attended (58986)    [] Other: Total Timed Code Tx Minutes 40        Total Treatment Minutes 40        Charge Justification:  (23481) THERAPEUTIC EXERCISE - Provided verbal/tactile cueing for activities related to strengthening, flexibility, endurance, ROM performed to prevent loss of range of motion, maintain or improve muscular strength or increase flexibility, following either an injury or surgery. (02636) [facility]- Reviewed/Progressed HEP activities related to strengthening, flexibility, endurance, ROM performed to prevent loss of range of motion, maintain or improve muscular strength or increase flexibility, following either an injury or surgery.   (18603) NEUROMUSCULAR RE-EDUCATION- Therapeutic procedure, 1 or more areas, each 15 minutes;

## 2023-11-07 ENCOUNTER — HOSPITAL ENCOUNTER (OUTPATIENT)
Dept: PHYSICAL THERAPY | Age: 38
Setting detail: THERAPIES SERIES
Discharge: HOME | End: 2023-11-07
Attending: FAMILY MEDICINE
Payer: COMMERCIAL

## 2023-11-07 PROCEDURE — 97110 THERAPEUTIC EXERCISES: CPT

## 2023-11-14 ENCOUNTER — HOSPITAL ENCOUNTER (OUTPATIENT)
Dept: PHYSICAL THERAPY | Age: 38
Setting detail: THERAPIES SERIES
Discharge: HOME | End: 2023-11-14
Attending: FAMILY MEDICINE
Payer: COMMERCIAL

## 2023-11-14 NOTE — FLOWSHEET NOTE
[address] [address]  Phone: [phone]   Fax:     [phone]    Physical Therapy  Cancellation/No-show Note  Patient Name:  Sharri Abbasi  :  1985   Date:  2023  Cancelled visits to date: 2  No-shows to date: 0    Patient status for today's appointment patient:  [x]  Cancelled  []  Rescheduled appointment  []  No-show     Reason given by patient:  []  Patient ill  []  Conflicting appointment  []  No transportation    []  Conflict with work  []  No reason given  [x]  Other:     Comments:      Phone call information:   []  Phone call made today to patient at _ time at number provided:      []  Patient answered, conversation as follows:    []  Patient did not answer, message left as follows:  [x]  Phone call not made today    Electronically signed by:  Arelis Rae PT

## 2024-01-09 ENCOUNTER — OFFICE VISIT (OUTPATIENT)
Dept: FAMILY MEDICINE CLINIC | Age: 39
End: 2024-01-09
Payer: COMMERCIAL

## 2024-01-09 VITALS
HEART RATE: 69 BPM | RESPIRATION RATE: 18 BRPM | TEMPERATURE: 98.4 F | SYSTOLIC BLOOD PRESSURE: 104 MMHG | WEIGHT: 242.2 LBS | OXYGEN SATURATION: 98 % | DIASTOLIC BLOOD PRESSURE: 70 MMHG | BODY MASS INDEX: 35.77 KG/M2

## 2024-01-09 DIAGNOSIS — Z13.220 SCREENING, LIPID: ICD-10-CM

## 2024-01-09 DIAGNOSIS — F51.04 PSYCHOPHYSIOLOGICAL INSOMNIA: ICD-10-CM

## 2024-01-09 DIAGNOSIS — F33.1 MODERATE EPISODE OF RECURRENT MAJOR DEPRESSIVE DISORDER (HCC): ICD-10-CM

## 2024-01-09 DIAGNOSIS — I10 PRIMARY HYPERTENSION: Primary | ICD-10-CM

## 2024-01-09 PROCEDURE — 3074F SYST BP LT 130 MM HG: CPT | Performed by: FAMILY MEDICINE

## 2024-01-09 PROCEDURE — 99214 OFFICE O/P EST MOD 30 MIN: CPT | Performed by: FAMILY MEDICINE

## 2024-01-09 PROCEDURE — 3078F DIAST BP <80 MM HG: CPT | Performed by: FAMILY MEDICINE

## 2024-01-09 ASSESSMENT — PATIENT HEALTH QUESTIONNAIRE - PHQ9
8. MOVING OR SPEAKING SO SLOWLY THAT OTHER PEOPLE COULD HAVE NOTICED. OR THE OPPOSITE, BEING SO FIGETY OR RESTLESS THAT YOU HAVE BEEN MOVING AROUND A LOT MORE THAN USUAL: 0
2. FEELING DOWN, DEPRESSED OR HOPELESS: 0
4. FEELING TIRED OR HAVING LITTLE ENERGY: 3
SUM OF ALL RESPONSES TO PHQ QUESTIONS 1-9: 6
6. FEELING BAD ABOUT YOURSELF - OR THAT YOU ARE A FAILURE OR HAVE LET YOURSELF OR YOUR FAMILY DOWN: 0
5. POOR APPETITE OR OVEREATING: 0
3. TROUBLE FALLING OR STAYING ASLEEP: 3
9. THOUGHTS THAT YOU WOULD BE BETTER OFF DEAD, OR OF HURTING YOURSELF: 0
SUM OF ALL RESPONSES TO PHQ QUESTIONS 1-9: 6
10. IF YOU CHECKED OFF ANY PROBLEMS, HOW DIFFICULT HAVE THESE PROBLEMS MADE IT FOR YOU TO DO YOUR WORK, TAKE CARE OF THINGS AT HOME, OR GET ALONG WITH OTHER PEOPLE: 0
7. TROUBLE CONCENTRATING ON THINGS, SUCH AS READING THE NEWSPAPER OR WATCHING TELEVISION: 0
SUM OF ALL RESPONSES TO PHQ9 QUESTIONS 1 & 2: 0
1. LITTLE INTEREST OR PLEASURE IN DOING THINGS: 0
SUM OF ALL RESPONSES TO PHQ QUESTIONS 1-9: 6
SUM OF ALL RESPONSES TO PHQ QUESTIONS 1-9: 6

## 2024-01-09 NOTE — PROGRESS NOTES
2024    Blood pressure 104/70, pulse 69, temperature 98.4 °F (36.9 °C), temperature source Oral, resp. rate 18, weight 109.9 kg (242 lb 3.2 oz), SpO2 98 %.    Cristhian Draper (:  1985) is a 38 y.o. male, here for evaluation of the following medical concerns:    Chief Complaint   Patient presents with    Follow-up    Medication Refill     Here for BP check.  On Maxzide and Norvasc.  Last visit his BP was high and we added the clonidine 0.1 bid.  - BP's improved.  SE's noted: a bit more constipated since starting clonidine.  Wt up about 5 lbs.   Last renal function test:   Lab Results   Component Value Date/Time     2023 12:03 PM    K 3.7 2023 12:03 PM    K 3.1 10/05/2019 02:16 PM    CL 99 2023 12:03 PM    CO2 29 2023 12:03 PM    BUN 17 2023 12:03 PM    CREATININE 1.2 2023 12:03 PM    GLUCOSE 92 2023 12:03 PM    CALCIUM 9.6 2023 12:03 PM    LABGLOM >60 2023 12:03 PM          He has been bothered by keratoconus chronically, which causes photosensitivity.  So this is a chronic irritation.    Does not make him depressed.  Still uses Zoloft 100.  But struggles with sleep.  Clonidine helps with sleep sometimes.  He works late each day, until after 11 pm.  Takes him until 2 am to wind down.  Then has to get up at 6 with kids and it is hard to sleep after that.  He has timed clonidine to 12 and 12.  Could take it differently.    Last lipid test:  Lab Results   Component Value Date    CHOL 206 (H) 2021    TRIG 79 2021    HDL 68 (H) 2021    LDLCALC 122 (H) 2021     Lab Results   Component Value Date    ALT 10 2023    AST 21 2023           Patient Active Problem List   Diagnosis    Allergic rhinitis- dust, pollen animals.    Asthma    HTN (hypertension)    History of alcohol abuse- quit     History of depression- treated inpatient at [facility] 2008    Intracranial hemorrhage (HCC)- 2013 Dr Bradshaw    Shift work sleep

## 2024-01-23 DIAGNOSIS — I10 PRIMARY HYPERTENSION: ICD-10-CM

## 2024-01-23 DIAGNOSIS — Z13.220 SCREENING, LIPID: ICD-10-CM

## 2024-01-23 LAB
ALBUMIN SERPL-MCNC: 4.8 G/DL (ref 3.4–5)
ALBUMIN/GLOB SERPL: 2 {RATIO} (ref 1.1–2.2)
ALP SERPL-CCNC: 78 U/L (ref 40–129)
ALT SERPL-CCNC: 11 U/L (ref 10–40)
ANION GAP SERPL CALCULATED.3IONS-SCNC: 8 MMOL/L (ref 3–16)
AST SERPL-CCNC: 20 U/L (ref 15–37)
BILIRUB SERPL-MCNC: 0.4 MG/DL (ref 0–1)
BUN SERPL-MCNC: 23 MG/DL (ref 7–20)
CALCIUM SERPL-MCNC: 9.2 MG/DL (ref 8.3–10.6)
CHLORIDE SERPL-SCNC: 101 MMOL/L (ref 99–110)
CHOLEST SERPL-MCNC: 224 MG/DL (ref 0–199)
CO2 SERPL-SCNC: 32 MMOL/L (ref 21–32)
CREAT SERPL-MCNC: 1.1 MG/DL (ref 0.9–1.3)
GFR SERPLBLD CREATININE-BSD FMLA CKD-EPI: >60 ML/MIN/{1.73_M2}
GLUCOSE SERPL-MCNC: 94 MG/DL (ref 70–99)
HDLC SERPL-MCNC: 57 MG/DL (ref 40–60)
LDLC SERPL CALC-MCNC: 149 MG/DL
POTASSIUM SERPL-SCNC: 3.6 MMOL/L (ref 3.5–5.1)
PROT SERPL-MCNC: 7.2 G/DL (ref 6.4–8.2)
SODIUM SERPL-SCNC: 141 MMOL/L (ref 136–145)
TRIGL SERPL-MCNC: 91 MG/DL (ref 0–150)
VLDLC SERPL CALC-MCNC: 18 MG/DL

## 2024-04-15 RX ORDER — FLUTICASONE PROPIONATE AND SALMETEROL 250; 50 UG/1; UG/1
POWDER RESPIRATORY (INHALATION)
Qty: 60 EACH | Refills: 3 | Status: SHIPPED | OUTPATIENT
Start: 2024-04-15

## 2024-04-25 RX ORDER — AMLODIPINE BESYLATE 10 MG/1
TABLET ORAL
Qty: 90 TABLET | Refills: 1 | Status: SHIPPED | OUTPATIENT
Start: 2024-04-25

## 2024-05-06 ENCOUNTER — OFFICE VISIT (OUTPATIENT)
Dept: FAMILY MEDICINE CLINIC | Age: 39
End: 2024-05-06
Payer: COMMERCIAL

## 2024-05-06 VITALS
OXYGEN SATURATION: 96 % | SYSTOLIC BLOOD PRESSURE: 112 MMHG | WEIGHT: 251.4 LBS | RESPIRATION RATE: 18 BRPM | HEART RATE: 66 BPM | DIASTOLIC BLOOD PRESSURE: 86 MMHG | BODY MASS INDEX: 37.13 KG/M2 | TEMPERATURE: 99.2 F

## 2024-05-06 DIAGNOSIS — J45.21 MILD INTERMITTENT ASTHMA WITH EXACERBATION: Primary | ICD-10-CM

## 2024-05-06 PROCEDURE — 99214 OFFICE O/P EST MOD 30 MIN: CPT | Performed by: FAMILY MEDICINE

## 2024-05-06 PROCEDURE — 3079F DIAST BP 80-89 MM HG: CPT | Performed by: FAMILY MEDICINE

## 2024-05-06 PROCEDURE — 3074F SYST BP LT 130 MM HG: CPT | Performed by: FAMILY MEDICINE

## 2024-05-06 RX ORDER — PREDNISONE 20 MG/1
20 TABLET ORAL 2 TIMES DAILY
Qty: 10 TABLET | Refills: 0 | Status: SHIPPED | OUTPATIENT
Start: 2024-05-06 | End: 2024-05-11

## 2024-05-06 SDOH — ECONOMIC STABILITY: FOOD INSECURITY: WITHIN THE PAST 12 MONTHS, YOU WORRIED THAT YOUR FOOD WOULD RUN OUT BEFORE YOU GOT MONEY TO BUY MORE.: NEVER TRUE

## 2024-05-06 SDOH — ECONOMIC STABILITY: INCOME INSECURITY: HOW HARD IS IT FOR YOU TO PAY FOR THE VERY BASICS LIKE FOOD, HOUSING, MEDICAL CARE, AND HEATING?: SOMEWHAT HARD

## 2024-05-06 SDOH — ECONOMIC STABILITY: FOOD INSECURITY: WITHIN THE PAST 12 MONTHS, THE FOOD YOU BOUGHT JUST DIDN'T LAST AND YOU DIDN'T HAVE MONEY TO GET MORE.: NEVER TRUE

## 2024-05-06 ASSESSMENT — PATIENT HEALTH QUESTIONNAIRE - PHQ9
2. FEELING DOWN, DEPRESSED OR HOPELESS: NOT AT ALL
4. FEELING TIRED OR HAVING LITTLE ENERGY: NOT AT ALL
7. TROUBLE CONCENTRATING ON THINGS, SUCH AS READING THE NEWSPAPER OR WATCHING TELEVISION: NOT AT ALL
1. LITTLE INTEREST OR PLEASURE IN DOING THINGS: NOT AT ALL
10. IF YOU CHECKED OFF ANY PROBLEMS, HOW DIFFICULT HAVE THESE PROBLEMS MADE IT FOR YOU TO DO YOUR WORK, TAKE CARE OF THINGS AT HOME, OR GET ALONG WITH OTHER PEOPLE: NOT DIFFICULT AT ALL
SUM OF ALL RESPONSES TO PHQ QUESTIONS 1-9: 0
9. THOUGHTS THAT YOU WOULD BE BETTER OFF DEAD, OR OF HURTING YOURSELF: NOT AT ALL
SUM OF ALL RESPONSES TO PHQ QUESTIONS 1-9: 0
3. TROUBLE FALLING OR STAYING ASLEEP: NOT AT ALL
5. POOR APPETITE OR OVEREATING: NOT AT ALL
SUM OF ALL RESPONSES TO PHQ QUESTIONS 1-9: 0
SUM OF ALL RESPONSES TO PHQ9 QUESTIONS 1 & 2: 0
6. FEELING BAD ABOUT YOURSELF - OR THAT YOU ARE A FAILURE OR HAVE LET YOURSELF OR YOUR FAMILY DOWN: NOT AT ALL
8. MOVING OR SPEAKING SO SLOWLY THAT OTHER PEOPLE COULD HAVE NOTICED. OR THE OPPOSITE, BEING SO FIGETY OR RESTLESS THAT YOU HAVE BEEN MOVING AROUND A LOT MORE THAN USUAL: NOT AT ALL
SUM OF ALL RESPONSES TO PHQ QUESTIONS 1-9: 0

## 2024-05-06 NOTE — PROGRESS NOTES
2024    Blood pressure 112/86, pulse 66, temperature 99.2 °F (37.3 °C), temperature source Oral, resp. rate 18, weight 114 kg (251 lb 6.4 oz), SpO2 96 %.    Cristhian Draper (:  1985) is a 39 y.o. male, here for evaluation of the following medical concerns:    Chief Complaint   Patient presents with    Other     Patient has been having difficulties with his Asthma and Allergies.  He has had to use his Nebulizer and Rescue inhaler.  He missed work yesterday and today.     Here for routine follow up of asthma. Flared up now, for past week.  Likely grass allergy  Using Advair bid (250)  Usuing albuterol several times a day now. (Typical use is 'none at all' the past couple years)    No f/c  No chest pain- just feels tight.  + ENT sx: nasal d/c, cough, congestion starting last week.    No new meds.    No tobacco  + MJ, only as edible or vape    Patient Active Problem List   Diagnosis    Allergic rhinitis- dust, pollen animals.    Asthma    HTN (hypertension)    History of alcohol abuse- quit     History of depression- treated inpatient at [facility]     Intracranial hemorrhage (HCC)-  Dr Bradshaw    Shift work sleep disorder    Keratoconus of left eye- follows with optho    Ganglion cyst of right foot    Anxiety disorder    Moderate episode of recurrent major depressive disorder (HCC)    Psychophysiological insomnia        Body mass index is 37.13 kg/m².    Wt Readings from Last 3 Encounters:   24 114 kg (251 lb 6.4 oz)   24 109.9 kg (242 lb 3.2 oz)   10/22/23 108.4 kg (238 lb 15.7 oz)       BP Readings from Last 3 Encounters:   24 112/86   24 104/70   10/22/23 (!) 148/96       Allergies   Allergen Reactions    Losartan Swelling     Angioedema       Prior to Visit Medications    Medication Sig Taking? Authorizing Provider   amLODIPine (NORVASC) 10 MG tablet Take 1 tablet by mouth once daily Yes Kingsley Whittaker MD   fluticasone-salmeterol (ADVAIR) 250-50 MCG/ACT AEPB diskus

## 2024-05-13 RX ORDER — SERTRALINE HYDROCHLORIDE 100 MG/1
TABLET, FILM COATED ORAL
Qty: 90 TABLET | Refills: 1 | Status: SHIPPED | OUTPATIENT
Start: 2024-05-13

## 2024-05-20 RX ORDER — TRIAMTERENE AND HYDROCHLOROTHIAZIDE 75; 50 MG/1; MG/1
TABLET ORAL
Qty: 90 TABLET | Refills: 1 | Status: SHIPPED | OUTPATIENT
Start: 2024-05-20

## 2024-07-16 ENCOUNTER — OFFICE VISIT (OUTPATIENT)
Dept: FAMILY MEDICINE CLINIC | Age: 39
End: 2024-07-16
Payer: COMMERCIAL

## 2024-07-16 VITALS
OXYGEN SATURATION: 97 % | SYSTOLIC BLOOD PRESSURE: 108 MMHG | TEMPERATURE: 98.9 F | RESPIRATION RATE: 18 BRPM | BODY MASS INDEX: 37.45 KG/M2 | WEIGHT: 253.6 LBS | HEART RATE: 75 BPM | DIASTOLIC BLOOD PRESSURE: 78 MMHG

## 2024-07-16 DIAGNOSIS — I10 PRIMARY HYPERTENSION: ICD-10-CM

## 2024-07-16 DIAGNOSIS — F51.04 PSYCHOPHYSIOLOGICAL INSOMNIA: ICD-10-CM

## 2024-07-16 DIAGNOSIS — J45.20 MILD INTERMITTENT ASTHMA WITHOUT COMPLICATION: ICD-10-CM

## 2024-07-16 DIAGNOSIS — F33.1 MODERATE EPISODE OF RECURRENT MAJOR DEPRESSIVE DISORDER (HCC): Primary | ICD-10-CM

## 2024-07-16 DIAGNOSIS — F41.9 ANXIETY DISORDER, UNSPECIFIED TYPE: ICD-10-CM

## 2024-07-16 PROCEDURE — 3074F SYST BP LT 130 MM HG: CPT | Performed by: FAMILY MEDICINE

## 2024-07-16 PROCEDURE — 99214 OFFICE O/P EST MOD 30 MIN: CPT | Performed by: FAMILY MEDICINE

## 2024-07-16 PROCEDURE — 3078F DIAST BP <80 MM HG: CPT | Performed by: FAMILY MEDICINE

## 2024-07-16 RX ORDER — FLUTICASONE PROPIONATE AND SALMETEROL 250; 50 UG/1; UG/1
1 POWDER RESPIRATORY (INHALATION) 2 TIMES DAILY
Qty: 3 EACH | Refills: 1 | Status: SHIPPED | OUTPATIENT
Start: 2024-07-16

## 2024-07-16 RX ORDER — TRIAMTERENE AND HYDROCHLOROTHIAZIDE 75; 50 MG/1; MG/1
1 TABLET ORAL DAILY
Qty: 90 TABLET | Refills: 1 | Status: SHIPPED | OUTPATIENT
Start: 2024-07-16

## 2024-07-16 RX ORDER — CLONIDINE HYDROCHLORIDE 0.1 MG/1
0.1 TABLET ORAL NIGHTLY
Qty: 90 TABLET | Refills: 1 | Status: SHIPPED | OUTPATIENT
Start: 2024-07-16

## 2024-07-16 RX ORDER — AMLODIPINE BESYLATE 10 MG/1
10 TABLET ORAL DAILY
Qty: 90 TABLET | Refills: 1 | Status: SHIPPED | OUTPATIENT
Start: 2024-07-16

## 2024-07-16 NOTE — PROGRESS NOTES
in acute distress.     Appearance: Normal appearance. He is well-developed. He is not diaphoretic.   Cardiovascular:      Rate and Rhythm: Normal rate and regular rhythm.      Pulses: Normal pulses.      Heart sounds: Normal heart sounds. No murmur heard.  Pulmonary:      Effort: Pulmonary effort is normal. No respiratory distress.      Breath sounds: Normal breath sounds. No wheezing or rales.   Musculoskeletal:      Cervical back: Normal range of motion.      Right lower leg: No edema.      Left lower leg: No edema.   Skin:     General: Skin is warm and dry.   Neurological:      General: No focal deficit present.      Mental Status: He is alert and oriented to person, place, and time. Mental status is at baseline.   Psychiatric:         Mood and Affect: Mood normal.         Behavior: Behavior normal.         Thought Content: Thought content normal.         Judgment: Judgment normal.         ASSESSMENT/PLAN:    1. Moderate episode of recurrent major depressive disorder (HCC)  - currently stable. He will wean off Zoloft gradually. discussed how to do this and provided 50 mg tabs to help the wean.  If symptoms recur, will consider non-ssri therapy or a newer ssri.    2. Anxiety disorder, unspecified type  - Stable; ok to wean off Zoloft.    3. Mild intermittent asthma without complication  - Stable with rare use of albuterol; continue Advair 250 for prevention.     4. Primary hypertension  - Blood pressure is at goal on current therapy; continue meds and monitoring. Regular physical activity and healthy diet (low sodium, multiple servings of produce daily) was recommended.  Renal function to be assessed yearly.     5. Psychophysiological insomnia  - Stable; continue clonidine 0.1      Return in about 6 months (around 1/16/2025) for follow up HTN, follow up depression.    An  [employer] was used to authenticate this note.    --Kingsley Whittaker MD on 7/16/2024 at 2:00 PM

## 2025-01-21 ENCOUNTER — OFFICE VISIT (OUTPATIENT)
Dept: FAMILY MEDICINE CLINIC | Age: 40
End: 2025-01-21

## 2025-01-21 VITALS
SYSTOLIC BLOOD PRESSURE: 120 MMHG | RESPIRATION RATE: 18 BRPM | BODY MASS INDEX: 37.24 KG/M2 | WEIGHT: 252.2 LBS | HEART RATE: 64 BPM | DIASTOLIC BLOOD PRESSURE: 80 MMHG | OXYGEN SATURATION: 99 %

## 2025-01-21 DIAGNOSIS — G25.81 RESTLESS LEGS: ICD-10-CM

## 2025-01-21 DIAGNOSIS — J45.20 MILD INTERMITTENT ASTHMA WITHOUT COMPLICATION: ICD-10-CM

## 2025-01-21 DIAGNOSIS — F33.42 RECURRENT MAJOR DEPRESSIVE DISORDER, IN FULL REMISSION (HCC): Primary | ICD-10-CM

## 2025-01-21 DIAGNOSIS — Z13.220 SCREENING, LIPID: ICD-10-CM

## 2025-01-21 DIAGNOSIS — I10 PRIMARY HYPERTENSION: ICD-10-CM

## 2025-01-21 RX ORDER — CLONIDINE HYDROCHLORIDE 0.1 MG/1
0.1 TABLET ORAL NIGHTLY
Qty: 90 TABLET | Refills: 1 | Status: SHIPPED | OUTPATIENT
Start: 2025-01-21

## 2025-01-21 RX ORDER — FLUTICASONE PROPIONATE AND SALMETEROL 250; 50 UG/1; UG/1
1 POWDER RESPIRATORY (INHALATION) 2 TIMES DAILY
Qty: 3 EACH | Refills: 1 | Status: SHIPPED | OUTPATIENT
Start: 2025-01-21

## 2025-01-21 RX ORDER — AMLODIPINE BESYLATE 10 MG/1
10 TABLET ORAL DAILY
Qty: 90 TABLET | Refills: 1 | Status: SHIPPED | OUTPATIENT
Start: 2025-01-21

## 2025-01-21 RX ORDER — ALBUTEROL SULFATE 90 UG/1
2 INHALANT RESPIRATORY (INHALATION) EVERY 6 HOURS PRN
Qty: 18 G | Refills: 0 | Status: SHIPPED | OUTPATIENT
Start: 2025-01-21

## 2025-01-21 RX ORDER — TRIAMTERENE AND HYDROCHLOROTHIAZIDE 75; 50 MG/1; MG/1
1 TABLET ORAL DAILY
Qty: 90 TABLET | Refills: 1 | Status: SHIPPED | OUTPATIENT
Start: 2025-01-21

## 2025-01-21 SDOH — ECONOMIC STABILITY: FOOD INSECURITY: WITHIN THE PAST 12 MONTHS, THE FOOD YOU BOUGHT JUST DIDN'T LAST AND YOU DIDN'T HAVE MONEY TO GET MORE.: NEVER TRUE

## 2025-01-21 SDOH — ECONOMIC STABILITY: FOOD INSECURITY: WITHIN THE PAST 12 MONTHS, YOU WORRIED THAT YOUR FOOD WOULD RUN OUT BEFORE YOU GOT MONEY TO BUY MORE.: NEVER TRUE

## 2025-01-21 ASSESSMENT — PATIENT HEALTH QUESTIONNAIRE - PHQ9
9. THOUGHTS THAT YOU WOULD BE BETTER OFF DEAD, OR OF HURTING YOURSELF: NOT AT ALL
SUM OF ALL RESPONSES TO PHQ9 QUESTIONS 1 & 2: 0
SUM OF ALL RESPONSES TO PHQ QUESTIONS 1-9: 0
SUM OF ALL RESPONSES TO PHQ QUESTIONS 1-9: 0
7. TROUBLE CONCENTRATING ON THINGS, SUCH AS READING THE NEWSPAPER OR WATCHING TELEVISION: NOT AT ALL
1. LITTLE INTEREST OR PLEASURE IN DOING THINGS: NOT AT ALL
2. FEELING DOWN, DEPRESSED OR HOPELESS: NOT AT ALL
4. FEELING TIRED OR HAVING LITTLE ENERGY: NOT AT ALL
SUM OF ALL RESPONSES TO PHQ QUESTIONS 1-9: 0
8. MOVING OR SPEAKING SO SLOWLY THAT OTHER PEOPLE COULD HAVE NOTICED. OR THE OPPOSITE, BEING SO FIGETY OR RESTLESS THAT YOU HAVE BEEN MOVING AROUND A LOT MORE THAN USUAL: NOT AT ALL
SUM OF ALL RESPONSES TO PHQ QUESTIONS 1-9: 0
5. POOR APPETITE OR OVEREATING: NOT AT ALL
3. TROUBLE FALLING OR STAYING ASLEEP: NOT AT ALL
6. FEELING BAD ABOUT YOURSELF - OR THAT YOU ARE A FAILURE OR HAVE LET YOURSELF OR YOUR FAMILY DOWN: NOT AT ALL

## 2025-01-21 NOTE — PROGRESS NOTES
2025    Blood pressure 120/80, pulse 64, resp. rate 18, weight 114.4 kg (252 lb 3.2 oz), SpO2 99%.    Cristhian Draper (:  1985) is a 39 y.o. male, here for evaluation of the following medical concerns:    Chief Complaint   Patient presents with    Follow-up    Depression     Patient doing good.  No new concerns.  Declines Flu Vaccine at this time.     Here for routine follow up of HTN/MDD    Feels he is doing well.  Mood: 'fine'  more on control of emotions.  Main issues is irritability at work, but can maintain contron.  He stopped Zoloft- feels fine not to restart    Sleep is still 'bad' some nights.  About 50%.  Still uses clonidine PRN. Sleeps well, but is groggy the next day and does not take if he has to get up early.  Has not tried a 1/2 tab.  Restless legs bother him at night.    HTN: on maxzide/Norvasc  BP stable today.  Does not test at home  Hx prior ICH related to HTN.    Last renal function test:   Lab Results   Component Value Date/Time     2024 02:14 PM    K 3.6 2024 02:14 PM    K 3.1 10/05/2019 02:16 PM     2024 02:14 PM    CO2 32 2024 02:14 PM    BUN 23 2024 02:14 PM    CREATININE 1.1 2024 02:14 PM    GLUCOSE 94 2024 02:14 PM    CALCIUM 9.2 2024 02:14 PM    LABGLOM >60 2024 02:14 PM          Last lipid test:  Lab Results   Component Value Date    CHOL 224 (H) 2024    TRIG 91 2024    HDL 57 2024     Lab Results   Component Value Date    ALT 11 2024    AST 20 2024          Asthma; remains on Advair.  Last flare up was  with weather change; managed with albuterol 'a couple times'  did not need steroids. Uses albuterol rarely.  Does not interfere with exercise (but has not done much recently)       Patient Active Problem List   Diagnosis    Allergic rhinitis- dust, pollen animals.    Asthma    HTN (hypertension)    History of alcohol abuse- quit     History of depression- treated

## 2025-01-21 NOTE — ASSESSMENT & PLAN NOTE
- Blood pressure is at goal on current therapy; continue meds and monitoring. Regular physical activity and healthy diet (low sodium, multiple servings of produce daily) was recommended.  Renal function to be assessed yearly.     Orders:    Comprehensive Metabolic Panel; Future

## 2025-01-22 DIAGNOSIS — G25.81 RESTLESS LEGS: ICD-10-CM

## 2025-01-22 DIAGNOSIS — Z13.220 SCREENING, LIPID: ICD-10-CM

## 2025-01-22 DIAGNOSIS — I10 PRIMARY HYPERTENSION: ICD-10-CM

## 2025-01-22 LAB
ALBUMIN SERPL-MCNC: 4.5 G/DL (ref 3.4–5)
ALBUMIN/GLOB SERPL: 1.7 {RATIO} (ref 1.1–2.2)
ALP SERPL-CCNC: 79 U/L (ref 40–129)
ALT SERPL-CCNC: 16 U/L (ref 10–40)
ANION GAP SERPL CALCULATED.3IONS-SCNC: 11 MMOL/L (ref 3–16)
AST SERPL-CCNC: 27 U/L (ref 15–37)
BILIRUB SERPL-MCNC: 0.3 MG/DL (ref 0–1)
BUN SERPL-MCNC: 20 MG/DL (ref 7–20)
CALCIUM SERPL-MCNC: 9.5 MG/DL (ref 8.3–10.6)
CHLORIDE SERPL-SCNC: 102 MMOL/L (ref 99–110)
CHOLEST SERPL-MCNC: 204 MG/DL (ref 0–199)
CO2 SERPL-SCNC: 30 MMOL/L (ref 21–32)
CREAT SERPL-MCNC: 1.1 MG/DL (ref 0.9–1.3)
DEPRECATED RDW RBC AUTO: 14 % (ref 12.4–15.4)
FERRITIN SERPL IA-MCNC: 354 NG/ML (ref 30–400)
GFR SERPLBLD CREATININE-BSD FMLA CKD-EPI: 88 ML/MIN/{1.73_M2}
GLUCOSE SERPL-MCNC: 101 MG/DL (ref 70–99)
HCT VFR BLD AUTO: 40.2 % (ref 40.5–52.5)
HDLC SERPL-MCNC: 47 MG/DL (ref 40–60)
HGB BLD-MCNC: 13.7 G/DL (ref 13.5–17.5)
IRON SATN MFR SERPL: 19 % (ref 20–50)
IRON SERPL-MCNC: 51 UG/DL (ref 59–158)
LDLC SERPL CALC-MCNC: 138 MG/DL
MCH RBC QN AUTO: 27.9 PG (ref 26–34)
MCHC RBC AUTO-ENTMCNC: 33.9 G/DL (ref 31–36)
MCV RBC AUTO: 82.2 FL (ref 80–100)
PLATELET # BLD AUTO: 314 K/UL (ref 135–450)
PMV BLD AUTO: 7.1 FL (ref 5–10.5)
POTASSIUM SERPL-SCNC: 3.4 MMOL/L (ref 3.5–5.1)
PROT SERPL-MCNC: 7.2 G/DL (ref 6.4–8.2)
RBC # BLD AUTO: 4.9 M/UL (ref 4.2–5.9)
SODIUM SERPL-SCNC: 143 MMOL/L (ref 136–145)
TIBC SERPL-MCNC: 262 UG/DL (ref 260–445)
TRIGL SERPL-MCNC: 97 MG/DL (ref 0–150)
VLDLC SERPL CALC-MCNC: 19 MG/DL
WBC # BLD AUTO: 6.1 K/UL (ref 4–11)

## 2025-05-06 ENCOUNTER — OFFICE VISIT (OUTPATIENT)
Dept: FAMILY MEDICINE CLINIC | Age: 40
End: 2025-05-06
Payer: COMMERCIAL

## 2025-05-06 VITALS
SYSTOLIC BLOOD PRESSURE: 120 MMHG | WEIGHT: 246 LBS | HEART RATE: 69 BPM | RESPIRATION RATE: 16 BRPM | BODY MASS INDEX: 36.43 KG/M2 | HEIGHT: 69 IN | OXYGEN SATURATION: 98 % | DIASTOLIC BLOOD PRESSURE: 86 MMHG

## 2025-05-06 DIAGNOSIS — I10 PRIMARY HYPERTENSION: Primary | ICD-10-CM

## 2025-05-06 DIAGNOSIS — E61.1 IRON DEFICIENCY: ICD-10-CM

## 2025-05-06 DIAGNOSIS — J45.20 MILD INTERMITTENT ASTHMA WITHOUT COMPLICATION: ICD-10-CM

## 2025-05-06 DIAGNOSIS — F41.9 ANXIETY DISORDER, UNSPECIFIED TYPE: ICD-10-CM

## 2025-05-06 PROCEDURE — 99214 OFFICE O/P EST MOD 30 MIN: CPT | Performed by: FAMILY MEDICINE

## 2025-05-06 PROCEDURE — 3079F DIAST BP 80-89 MM HG: CPT | Performed by: FAMILY MEDICINE

## 2025-05-06 PROCEDURE — 3074F SYST BP LT 130 MM HG: CPT | Performed by: FAMILY MEDICINE

## 2025-05-06 RX ORDER — FERROUS SULFATE 325(65) MG
325 TABLET, DELAYED RELEASE (ENTERIC COATED) ORAL 2 TIMES DAILY
COMMUNITY
Start: 2025-05-06

## 2025-05-06 NOTE — ASSESSMENT & PLAN NOTE
- Blood pressure is at goal on current therapy; continue meds and monitoring. Regular physical activity and healthy diet (low sodium, multiple servings of produce daily) was recommended.  Renal function to be assessed yearly.

## 2025-05-06 NOTE — PROGRESS NOTES
2025    Blood pressure 120/86, pulse 69, resp. rate 16, height 1.753 m (5' 9\"), weight 111.6 kg (246 lb), SpO2 98%.    Cristhian Draper (:  1985) is a 40 y.o. male, here for evaluation of the following medical concerns:    Chief Complaint   Patient presents with    Hypertension     Pt is here for a f/u      Here for routine follow up of HTN.    Noted to have low iron in eval for restless legs.  Ferritin was 354 (so may be high for inflammatory reasons, not an indicator of iron stores).  H/h borderline low.  Denies any melena or hematochezia.  No abnormal bleeding.  He still has restless leg symptoms at night.  (Massage does help for a while, being on feet all day at work makes worse).  Left leg achy at night - but only when not moving and feeling 'restless' at night.  Does not bother him during the day.    No unintended weight loss (weight down 6 lbs from last visit- says he is trying to control portions better)  He is not actively exercising.    No prior endoscopy.  Normal diet with meat (no pork)  No FH anemia  No hx diarrhea (tends to constipation)      HTN: on Norvasc, maxzide.   No hx of CAD, TIA, CVA or PVD.   Last renal function test:   Lab Results   Component Value Date/Time     2025 12:31 PM    K 3.4 2025 12:31 PM    K 3.1 10/05/2019 02:16 PM     2025 12:31 PM    CO2 30 2025 12:31 PM    BUN 20 2025 12:31 PM    CREATININE 1.1 2025 12:31 PM    GLUCOSE 101 2025 12:31 PM    CALCIUM 9.5 2025 12:31 PM    LABGLOM 88 2025 12:31 PM    LABGLOM >60 2024 02:14 PM          He stopped Zoloft.   Since stopping, he has a more difficult time 'coming down' when angry.  But he is happy that he does feel emotions and this is not really problematic.  Does not feel depressed  Still not using alcohol.    Clonidine helps sleep (not for BP).  Not perfect, but better than other options.    Asthma: on Advair 'at least once a day'  no flare ups.  Not

## 2025-05-07 LAB
25(OH)D3 SERPL-MCNC: 22.7 NG/ML
FOLATE SERPL-MCNC: 18.6 NG/ML (ref 4.78–24.2)
IGA SERPL-MCNC: 144 MG/DL (ref 70–400)
TISSUE TRANSGLUTAMINASE IGA: <0.5 U/ML (ref 0–14)
VIT B12 SERPL-MCNC: 941 PG/ML (ref 211–911)

## 2025-05-08 ENCOUNTER — RESULTS FOLLOW-UP (OUTPATIENT)
Dept: FAMILY MEDICINE CLINIC | Age: 40
End: 2025-05-08

## 2025-05-13 LAB
HGB FRACT BLD ELPH-IMP: NORMAL
HGB FRACT BLD ELPH-IMP: NORMAL

## 2025-08-21 ENCOUNTER — PATIENT MESSAGE (OUTPATIENT)
Dept: FAMILY MEDICINE CLINIC | Age: 40
End: 2025-08-21

## 2025-08-22 RX ORDER — CLONIDINE HYDROCHLORIDE 0.2 MG/1
0.2 TABLET ORAL NIGHTLY
Qty: 90 TABLET | Refills: 1 | Status: SHIPPED | OUTPATIENT
Start: 2025-08-22

## (undated) DEVICE — KIT OR ROOM TURNOVER W/STRAP

## (undated) DEVICE — GLOVE SURG SZ 65 L12IN FNGR THK87MIL WHT LTX FREE

## (undated) DEVICE — ELECTROSURGICAL PENCIL BUTTON SWITCH NON COATED BLADE ELECTRODE 10 FT (3 M) CORD HOLSTER: Brand: MEGADYNE

## (undated) DEVICE — SOLUTION SCRB 4OZ 10% POVIDONE IOD ANTIMIC BTL

## (undated) DEVICE — SUTURE CHROMIC GUT SZ 3-0 L27IN ABSRB BRN L26MM SH 1/2 CIR G122H

## (undated) DEVICE — SOLUTION IV IRRIG POUR BRL 0.9% SODIUM CHL 2F7124

## (undated) DEVICE — Z DISCONTINUED BY MEDLINE USE 2711682 TRAY SKIN PREP DRY W/ PREM GLV

## (undated) DEVICE — SUTURE PROL SZ 3-0 L36IN NONABSORBABLE BLU L26MM SH 1/2 CIR 8522H

## (undated) DEVICE — SHEET, T, LAPAROTOMY, STERILE: Brand: MEDLINE

## (undated) DEVICE — TUBING, SUCTION, 1/4" X 12', STRAIGHT: Brand: MEDLINE

## (undated) DEVICE — ELECTRODE PT RET AD L9FT HI MOIST COND ADH HYDRGEL CORDED

## (undated) DEVICE — MINOR SET UP PK

## (undated) DEVICE — PENROSE DRAIN 18 X .5" SILICONE: Brand: MEDLINE

## (undated) DEVICE — SOLUTION PREP POVIDONE IOD FOR SKIN MUCOUS MEM PRIOR TO

## (undated) DEVICE — SPONGE GZ W4XL4IN COT 12 PLY TYP VII WVN C FLD DSGN

## (undated) DEVICE — Device

## (undated) DEVICE — SUTURE CHROMIC GUT SZ 2-0 L27IN ABSRB BRN L26MM SH 1/2 CIR G123H